# Patient Record
Sex: MALE | Race: WHITE | NOT HISPANIC OR LATINO | Employment: FULL TIME | URBAN - METROPOLITAN AREA
[De-identification: names, ages, dates, MRNs, and addresses within clinical notes are randomized per-mention and may not be internally consistent; named-entity substitution may affect disease eponyms.]

---

## 2023-07-18 ENCOUNTER — OFFICE VISIT (OUTPATIENT)
Dept: PRIMARY CARE | Facility: CLINIC | Age: 38
End: 2023-07-18
Payer: COMMERCIAL

## 2023-07-18 VITALS
HEART RATE: 82 BPM | WEIGHT: 217 LBS | BODY MASS INDEX: 26.42 KG/M2 | DIASTOLIC BLOOD PRESSURE: 82 MMHG | SYSTOLIC BLOOD PRESSURE: 121 MMHG | TEMPERATURE: 98.6 F | OXYGEN SATURATION: 96 % | HEIGHT: 76 IN

## 2023-07-18 DIAGNOSIS — R09.1 VIRAL PLEURISY: Primary | ICD-10-CM

## 2023-07-18 PROCEDURE — 1036F TOBACCO NON-USER: CPT | Performed by: FAMILY MEDICINE

## 2023-07-18 PROCEDURE — 99213 OFFICE O/P EST LOW 20 MIN: CPT | Performed by: FAMILY MEDICINE

## 2023-07-18 ASSESSMENT — LIFESTYLE VARIABLES
HOW OFTEN DO YOU HAVE A DRINK CONTAINING ALCOHOL: 2-3 TIMES A WEEK
HOW MANY STANDARD DRINKS CONTAINING ALCOHOL DO YOU HAVE ON A TYPICAL DAY: 1 OR 2
SKIP TO QUESTIONS 9-10: 1
HOW OFTEN DO YOU HAVE SIX OR MORE DRINKS ON ONE OCCASION: NEVER
AUDIT-C TOTAL SCORE: 3

## 2023-07-18 ASSESSMENT — ENCOUNTER SYMPTOMS
SHORTNESS OF BREATH: 1
ORTHOPNEA: 1
SWOLLEN GLANDS: 1
SORE THROAT: 1
HEADACHES: 1

## 2023-07-18 ASSESSMENT — PATIENT HEALTH QUESTIONNAIRE - PHQ9
1. LITTLE INTEREST OR PLEASURE IN DOING THINGS: NOT AT ALL
2. FEELING DOWN, DEPRESSED OR HOPELESS: NOT AT ALL
SUM OF ALL RESPONSES TO PHQ9 QUESTIONS 1 & 2: 0

## 2023-07-18 NOTE — PATIENT INSTRUCTIONS
Anticipate full recovery.  Rest fluids OTC NSAIDs as needed recommended  Schedule follow-up visit for complete physical

## 2023-07-18 NOTE — PROGRESS NOTES
"  Answers submitted by the patient for this visit:  Shortness of Breath Questionnaire (Submitted on 7/18/2023)  Chief Complaint: Shortness of breath  Chronicity: new  Onset: in the past 7 days  Frequency: constantly  Progression since onset: rapidly improving  chest pain: Yes  headaches: Yes  orthopnea: Yes  sore throat: Yes  swollen glands: Yes  Aggravating factors: nothing    38-year-old male 7 days ago developed low-grade fever body aches malaise fatigue with a Tmax of 99.6 x 48 hours ago developed pleuritic diffuse chest discomfort worse with inspiration without cough chest congestion wheeze hemoptysis leg pain swelling rash swollen tender joints.  The symptoms have practically resolved over the past 24 hours.  Denies pleuritic pain today.  Also denies nasal congestion sore throat otalgia otorrhea            /82   Pulse 82   Temp 37 °C (98.6 °F)   Ht 1.93 m (6' 4\")   Wt 98.4 kg (217 lb)   SpO2 96%   BMI 26.41 kg/m²       Well-appearing  Skin without pallor cyanosis icterus rash  TMs normal  Oropharynx normal neck supple nontender without adenopathy  Chest good inspiratory effort without rales rhonchi wheeze.  Chest wall nontender  Heart regular rate and rhythm without murmur  Abdomen soft nondistended nontender without organomegaly or mass  No swollen tender joints  Neuro grossly intact  " normal...

## 2023-08-18 ENCOUNTER — OFFICE VISIT (OUTPATIENT)
Dept: PRIMARY CARE | Facility: CLINIC | Age: 38
End: 2023-08-18
Payer: COMMERCIAL

## 2023-08-18 VITALS
OXYGEN SATURATION: 96 % | DIASTOLIC BLOOD PRESSURE: 70 MMHG | HEIGHT: 76 IN | WEIGHT: 214 LBS | BODY MASS INDEX: 26.06 KG/M2 | SYSTOLIC BLOOD PRESSURE: 124 MMHG | TEMPERATURE: 97.5 F | HEART RATE: 84 BPM

## 2023-08-18 DIAGNOSIS — Z00.00 ROUTINE ADULT HEALTH MAINTENANCE: Primary | ICD-10-CM

## 2023-08-18 DIAGNOSIS — R06.83 SNORING: ICD-10-CM

## 2023-08-18 DIAGNOSIS — J30.1 SEASONAL ALLERGIC RHINITIS DUE TO POLLEN: ICD-10-CM

## 2023-08-18 DIAGNOSIS — Z30.09 ENCOUNTER FOR VASECTOMY COUNSELING: ICD-10-CM

## 2023-08-18 PROCEDURE — 1036F TOBACCO NON-USER: CPT | Performed by: FAMILY MEDICINE

## 2023-08-18 PROCEDURE — 99395 PREV VISIT EST AGE 18-39: CPT | Performed by: FAMILY MEDICINE

## 2023-08-18 ASSESSMENT — PROMIS GLOBAL HEALTH SCALE
RATE_PHYSICAL_HEALTH: VERY GOOD
EMOTIONAL_PROBLEMS: SOMETIMES
CARRYOUT_PHYSICAL_ACTIVITIES: COMPLETELY
CARRYOUT_SOCIAL_ACTIVITIES: VERY GOOD
RATE_SOCIAL_SATISFACTION: VERY GOOD
RATE_GENERAL_HEALTH: VERY GOOD
RATE_QUALITY_OF_LIFE: VERY GOOD
RATE_AVERAGE_PAIN: 0
RATE_MENTAL_HEALTH: GOOD
RATE_AVERAGE_FATIGUE: MODERATE

## 2023-08-18 ASSESSMENT — PATIENT HEALTH QUESTIONNAIRE - PHQ9
SUM OF ALL RESPONSES TO PHQ9 QUESTIONS 1 & 2: 0
1. LITTLE INTEREST OR PLEASURE IN DOING THINGS: NOT AT ALL
2. FEELING DOWN, DEPRESSED OR HOPELESS: NOT AT ALL

## 2023-08-18 NOTE — PROGRESS NOTES
"C/o chronic nasal congestion and snorinh      ROS :   Denies fevers chills sweats malaise fatigue  Denies headache dysarthria aphasia focal weakness  Denies neck pain stiffness swollen glands sore throat otalgia otorrhea facial pressure  Denies chest pain shortness of breath diaphoresis nausea palpitations syncope presyncope dyspnea on exertion orthopnea cough  Denies abdominal pain nausea vomiting heartburn constipation diarrhea stool changes melena hematochezia  Denies urinary frequency dysuria hematuria pyuria hesitancy dribbling nocturia hematuria   Denies scrotal pain testicular mass erectile dysfunction  Denies numbness tingling weakness ataxia loss of dexterity  Denies depression anxiety insomnia      PE:    /70   Pulse 84   Temp 36.4 °C (97.5 °F)   Ht 1.93 m (6' 4\")   Wt 97.1 kg (214 lb)   SpO2 96%   BMI 26.05 kg/m²      Appears comfortable  Not ill-appearing  Normocephalic atraumatic  Respirations normal  No retractions  Skin  without pallor petechia icterus cyanosis  Neck without JVD thyromegaly bruits adenopathy  Chest clear to auscultation without rales rhonchi wheeze  Heart regular rate and rhythm without murmur  Abdomen soft nondistended nontender without organomegaly or mass   testes descended without mass or hernia  Rectal without mass or heme  Prostate normal without enlargement nodularity bogginess asymmetry or mass  Extremities without erythema edema Homans or cord  Peripheral pulses palpable  Neuro cranial nerves II through XII intact   no sensory  motor loss or tremor  Gait normal   Affect normal  Does not appear anxious or depressed          "

## 2023-08-21 ENCOUNTER — APPOINTMENT (OUTPATIENT)
Dept: PRIMARY CARE | Facility: CLINIC | Age: 38
End: 2023-08-21

## 2023-10-05 ENCOUNTER — ANCILLARY PROCEDURE (OUTPATIENT)
Dept: RADIOLOGY | Facility: CLINIC | Age: 38
End: 2023-10-05
Payer: COMMERCIAL

## 2023-10-05 DIAGNOSIS — J30.1 ALLERGIC RHINITIS DUE TO POLLEN: ICD-10-CM

## 2023-10-05 DIAGNOSIS — R06.83 SNORING: ICD-10-CM

## 2023-10-05 PROCEDURE — 70486 CT MAXILLOFACIAL W/O DYE: CPT

## 2023-10-05 PROCEDURE — 70486 CT MAXILLOFACIAL W/O DYE: CPT | Performed by: RADIOLOGY

## 2023-10-06 ENCOUNTER — TELEPHONE (OUTPATIENT)
Dept: PRIMARY CARE | Facility: CLINIC | Age: 38
End: 2023-10-06
Payer: COMMERCIAL

## 2023-10-06 DIAGNOSIS — J34.2 DEVIATED SEPTUM: Primary | ICD-10-CM

## 2023-10-06 NOTE — RESULT ENCOUNTER NOTE
CT results revealed markedly deviated septum.  This can cause chronic sinus problems.  I recommend follow-up with ENT this has been ordered

## 2023-10-12 ENCOUNTER — HOSPITAL ENCOUNTER (OUTPATIENT)
Facility: HOSPITAL | Age: 38
Setting detail: OUTPATIENT SURGERY
End: 2023-10-12
Attending: GENERAL PRACTICE | Admitting: GENERAL PRACTICE
Payer: COMMERCIAL

## 2023-10-12 ENCOUNTER — OFFICE VISIT (OUTPATIENT)
Dept: OTOLARYNGOLOGY | Facility: CLINIC | Age: 38
End: 2023-10-12
Payer: COMMERCIAL

## 2023-10-12 VITALS — BODY MASS INDEX: 25.93 KG/M2 | WEIGHT: 213 LBS

## 2023-10-12 DIAGNOSIS — J34.2 DEVIATED SEPTUM: ICD-10-CM

## 2023-10-12 DIAGNOSIS — M95.0 NASAL VALVE COLLAPSE: ICD-10-CM

## 2023-10-12 DIAGNOSIS — J34.3 HYPERTROPHY OF BOTH INFERIOR NASAL TURBINATES: ICD-10-CM

## 2023-10-12 DIAGNOSIS — M95.0 COLLAPSE OF NASAL VALVE: ICD-10-CM

## 2023-10-12 DIAGNOSIS — J31.0 CHRONIC RHINITIS: ICD-10-CM

## 2023-10-12 DIAGNOSIS — R09.81 NASAL CONGESTION: ICD-10-CM

## 2023-10-12 DIAGNOSIS — J01.91 ACUTE RECURRENT SINUSITIS, UNSPECIFIED LOCATION: Primary | ICD-10-CM

## 2023-10-12 DIAGNOSIS — J34.3 HYPERTROPHY, NASAL, TURBINATE: ICD-10-CM

## 2023-10-12 PROBLEM — J34.829 NASAL VALVE COLLAPSE: Status: ACTIVE | Noted: 2023-10-12

## 2023-10-12 PROCEDURE — 99204 OFFICE O/P NEW MOD 45 MIN: CPT | Performed by: GENERAL PRACTICE

## 2023-10-12 PROCEDURE — 1036F TOBACCO NON-USER: CPT | Performed by: GENERAL PRACTICE

## 2023-10-12 ASSESSMENT — PATIENT HEALTH QUESTIONNAIRE - PHQ9
2. FEELING DOWN, DEPRESSED OR HOPELESS: NOT AT ALL
1. LITTLE INTEREST OR PLEASURE IN DOING THINGS: NOT AT ALL
SUM OF ALL RESPONSES TO PHQ9 QUESTIONS 1 AND 2: 0

## 2023-10-12 NOTE — PROGRESS NOTES
Otolaryngology - Head and Neck Surgery Outpatient New Patient Visit Note         History Of Present Illness  Turner Knott is a 38 y.o. male presenting for evaluation of chronic nasal obstruction/congestion as well as snoring and possible EBER.    Reports longstanding nasal obstruction/congestion.    Reports waxing/waning symptoms of allergic rhinitis over time.  Limited imporvement with use of flonase and prior heavy use of antihistamines.     Reports flares of rhinosinusitis multiple times per year.     No prior nasal trauma/surgery.    Reports use of nasal cones with good effect, but often fall out overnight.     No PSG on file, prefers to avoid CPAP.               Past Medical History  He has no past medical history on file.    Surgical History  He has a past surgical history that includes No past surgeries.     Social History  He reports that he has never smoked. He has never used smokeless tobacco. No history on file for alcohol use and drug use.    Family History  Family History   Problem Relation Name Age of Onset    No Known Problems Mother      Diabetes Father          Allergies  Shellfish derived    Review of Systems  ROS: Pertinent positives as noted in HPI.    - CONSTITUTIONAL: Does not report weight loss, fever or chills.    - HEENT:   Ear: Does not report tinnitus, vertigo, hearing loss, otalgia, otorrhea  Nose: Does not report  ,  , epistaxis, decreased smell  Throat: Does not report pain, dysphagia, odynophagia  Larynx: Does not report hoarseness,  difficulty breathing, pain with speaking (odynophonia)  Neck: Does not report new masses, pain, swelling  Face: Does not report sinus pain, pressure, swelling, numbness, weakness     - RESPIRATORY: Does not report SOB or cough.    - CV: Does not report palpitations or chest pain.     - GI: Does not report abdominal pain, nausea, vomiting or diarrhea.    - : Does not report dysuria or urinary frequency.    - MSK: Does not report myalgia or joint  pain.    - SKIN: Does not report rash or pruritus.    - NEUROLOGICAL: Does not report headache or syncope.    - PSYCHIATRIC: Does not report recent changes in mood. Does not report anxiety or depression.         Physical Exam:     GENERAL:   Alert & Oriented to person, place and time; Normal affect and appearance. Well developed and well nourished. Conversant & cooperative with examination.     HEAD:   Normocephalic, atraumatic. No sinus tenderness to palpation. Normal parotid bilaterally. Normal facial strength.     NEUROLOGIC:   Cranial nerves II-XII grossly intact, gait WNL. Normal mood and affect.    EYES:   Extraocular movements intact. Pupils equal, round, reactive to light and accommodation. No nystagmus, no ptosis. no scleral injection.    EAR:   Normal auricle. No discomfort or TTP with manipulation.   Handheld otoscopic exam showed normal external auditory canals bilaterally. No purulence or EAC inflammation. Minimal cerumen.   Right tympanic membrane clear and mobile without evidence of perforation, retraction or middle ear effusion.   Left tympanic membrane clear and mobile without evidence of perforation, retraction or middle ear effusion.     NOSE:   No external deformity. No external nasal lesions, lacerations, or scars. Nasal tip symmetrical with normal nasal valves.  Dynamic collapse R>L with improvement with modified rosalee.    Nasal cavity with mid/posterior leftward septum, edematous  mucosa and turbinates. No lesions, masses, purulence or polyps.     OC/OP:   Mucous membranes moist, no masses, lesions or exudates.   Normal tongue, floor of mouth, teeth, gums, lips. Normal posterior pharyngeal wall.    Normal tonsils without erythema, exudate or obvious calculi     NECK:   No neck masses or thyroid enlargement. Trachea midline. No tenderness to palpation    LYMPHATIC:   No cervical lymphadenopathy.     RESPIRATORY:   Symmetric chest elevation & no retractions. No significant hoarseness. No  increased work of breathing.    CV:   No clubbing or cyanosis. No obvious edema    Skin:   No facial rashes, vesicles or lesions.     Extremities:   No gross abnormalities      Clinic Procedure        Information review:  External sources (notes, imaging, lab results) listed below personally reviewed to aid in medical decision making process.  - CT sinus  10/5/23 reviewed showing leftward deviated septum, paradoxical MT, R>L inferior turbinate hypertrophy, no significant sinusitis.     -[facility] Aaron Wen 8/18/23 note reviewed  - [facility] Oscar Gayle 7/18/23 note reviewed      Assessment/Plan   Problem List Items Addressed This Visit             ICD-10-CM       ENT    Deviated septum J34.2    Relevant Orders    Case Request Operating Room: Repair Septum Nasal Cavity, Repair Septum Nasal Cavity with Reduction Turbinate, Excision Lesion Nasal Cavity, Repair Nasal Valve (Completed)    Hypertrophy, nasal, turbinate J34.3    Relevant Orders    Case Request Operating Room: Repair Septum Nasal Cavity, Repair Septum Nasal Cavity with Reduction Turbinate, Excision Lesion Nasal Cavity, Repair Nasal Valve (Completed)    Nasal valve collapse M95.0    Relevant Orders    Case Request Operating Room: Repair Septum Nasal Cavity, Repair Septum Nasal Cavity with Reduction Turbinate, Excision Lesion Nasal Cavity, Repair Nasal Valve (Completed)     Other Visit Diagnoses         Codes    Acute recurrent sinusitis, unspecified location    -  Primary J01.91    Chronic rhinitis     J31.0          38yoM with snoring and likely EBER in the setting of nasal obstructino/congetion.    Discussed possible PSG and consideration of CPAP but pt declines for now.  Discussed possible ongoing medical management for rhinitis.   Discussed consideration of septoturbinoplasty, possible latera implant, possible clarifix procedure and the pt would like to proceed with this plan.     Risks, benefits and indications of the procedure were discussed.  This included  discussion of risks of pain, bleeding, infection, scarring with poor functional or cosmetic result, need for future procedures, or damage to surrounding structures to include regional blood vessels and nerves.  In particular, the risk of CSF leak, ongoing obstruction was highlighted.  All questions were answered.  The patient/caregiver indicated understanding of the discussion and elected to proceed with the procedure.  Will schedule as available.      Follow up:  -plan for follow up in clinic as needed    All of the above findings, impressions, treatment planning and follow up plans were discussed with the patient who indicated understanding.  the patient was instructed to contact or return to clinic sooner if symptoms/signs persist or worsen despite the above management.      Nico Mckinney MD  Otolaryngology - Head and Neck Surgery

## 2023-10-13 ENCOUNTER — LAB (OUTPATIENT)
Dept: LAB | Facility: LAB | Age: 38
End: 2023-10-13
Payer: COMMERCIAL

## 2023-10-13 DIAGNOSIS — Z00.00 ROUTINE ADULT HEALTH MAINTENANCE: ICD-10-CM

## 2023-10-13 LAB
ALBUMIN SERPL BCP-MCNC: 4.5 G/DL (ref 3.4–5)
ALP SERPL-CCNC: 85 U/L (ref 33–120)
ALT SERPL W P-5'-P-CCNC: 31 U/L (ref 10–52)
ANION GAP SERPL CALC-SCNC: 13 MMOL/L (ref 10–20)
AST SERPL W P-5'-P-CCNC: 24 U/L (ref 9–39)
BILIRUB SERPL-MCNC: 1.3 MG/DL (ref 0–1.2)
BUN SERPL-MCNC: 24 MG/DL (ref 6–23)
CALCIUM SERPL-MCNC: 9.8 MG/DL (ref 8.6–10.6)
CHLORIDE SERPL-SCNC: 101 MMOL/L (ref 98–107)
CHOLEST SERPL-MCNC: 252 MG/DL (ref 0–199)
CHOLESTEROL/HDL RATIO: 6.5
CO2 SERPL-SCNC: 28 MMOL/L (ref 21–32)
CREAT SERPL-MCNC: 1.2 MG/DL (ref 0.5–1.3)
GFR SERPL CREATININE-BSD FRML MDRD: 79 ML/MIN/1.73M*2
GLUCOSE SERPL-MCNC: 95 MG/DL (ref 74–99)
HDLC SERPL-MCNC: 38.8 MG/DL
LDLC SERPL CALC-MCNC: 169 MG/DL
NON HDL CHOLESTEROL: 213 MG/DL (ref 0–149)
POTASSIUM SERPL-SCNC: 4.2 MMOL/L (ref 3.5–5.3)
PROT SERPL-MCNC: 7.3 G/DL (ref 6.4–8.2)
SODIUM SERPL-SCNC: 138 MMOL/L (ref 136–145)
TRIGL SERPL-MCNC: 220 MG/DL (ref 0–149)
VLDL: 44 MG/DL (ref 0–40)

## 2023-10-13 PROCEDURE — 80061 LIPID PANEL: CPT

## 2023-10-13 PROCEDURE — 36415 COLL VENOUS BLD VENIPUNCTURE: CPT

## 2023-10-13 PROCEDURE — 80053 COMPREHEN METABOLIC PANEL: CPT

## 2023-10-18 PROBLEM — D50.8 HYPOCHROMIC RED BLOOD CELLS: Status: ACTIVE | Noted: 2018-05-03

## 2023-10-18 PROBLEM — F41.9 ANXIETY: Status: ACTIVE | Noted: 2018-04-23

## 2023-10-18 PROBLEM — F32.A DEPRESSION: Status: ACTIVE | Noted: 2018-04-23

## 2023-10-18 RX ORDER — TOBRAMYCIN 3 MG/ML
SOLUTION/ DROPS OPHTHALMIC
COMMUNITY
Start: 2021-12-19

## 2023-10-18 RX ORDER — TOBRAMYCIN AND DEXAMETHASONE 3; 1 MG/ML; MG/ML
SUSPENSION/ DROPS OPHTHALMIC
COMMUNITY
Start: 2022-11-18

## 2023-10-18 RX ORDER — DICLOFENAC SODIUM 10 MG/G
GEL TOPICAL
COMMUNITY
Start: 2013-12-09

## 2023-10-18 RX ORDER — CETIRIZINE HYDROCHLORIDE, PSEUDOEPHEDRINE HYDROCHLORIDE 5; 120 MG/1; MG/1
1 TABLET, FILM COATED, EXTENDED RELEASE ORAL
COMMUNITY
Start: 2021-12-19

## 2023-10-18 RX ORDER — HYDROXYZINE HYDROCHLORIDE 25 MG/1
25-50 TABLET, FILM COATED ORAL 3 TIMES DAILY PRN
COMMUNITY
Start: 2018-04-23

## 2023-10-18 RX ORDER — MELOXICAM 15 MG/1
15 TABLET ORAL DAILY
COMMUNITY
Start: 2013-11-26

## 2023-10-19 ENCOUNTER — OFFICE VISIT (OUTPATIENT)
Dept: UROLOGY | Facility: HOSPITAL | Age: 38
End: 2023-10-19
Payer: COMMERCIAL

## 2023-10-19 DIAGNOSIS — Z30.09 ENCOUNTER FOR VASECTOMY COUNSELING: Primary | ICD-10-CM

## 2023-10-19 PROCEDURE — 99213 OFFICE O/P EST LOW 20 MIN: CPT | Performed by: UROLOGY

## 2023-10-19 PROCEDURE — 99203 OFFICE O/P NEW LOW 30 MIN: CPT | Performed by: UROLOGY

## 2023-10-19 PROCEDURE — 1036F TOBACCO NON-USER: CPT | Performed by: UROLOGY

## 2023-10-19 NOTE — PROGRESS NOTES
HPI  38 y.o. year old male being seen for vasectomy consult.      , 2 children, ages 3, 4mo. Currently using condoms for protection. No bothersome LUTS. No UTIs, hematuria, stones. No history of scrotal trauma or surgery. Erections work well.     Current Medications:  Current Outpatient Medications   Medication Sig Dispense Refill    cetirizine-pseudoephedrine (ZyrTEC-D) 5-120 mg 12 hr tablet Take 1 tablet by mouth once daily.      diclofenac sodium (Voltaren) 1 % gel gel apply to affected area four times daily as directed      hydrOXYzine HCL (Atarax) 25 mg tablet Take 1-2 tablets (25-50 mg) by mouth 3 times a day as needed for anxiety (or sleep).      meloxicam (Mobic) 15 mg tablet Take 1 tablet (15 mg) by mouth once daily.      tobramycin (Tobrex) 0.3 % ophthalmic solution Use 2 drops in the effected eye every 4 hours while awake.      tobramycin-dexamethasone (Tobradex) ophthalmic suspension INSTILL 1 DROP IN THE RIGHT EYE EVERY 4 HOURS DAILY.       No current facility-administered medications for this visit.        Active Problems:  Turner Knott is a 38 y.o. male with the following Problems and Medications.  Patient Active Problem List   Diagnosis    Deviated septum    Hypertrophy, nasal, turbinate    Nasal valve collapse    Anxiety    Depression    Hypochromic red blood cells     Current Outpatient Medications   Medication Sig Dispense Refill    cetirizine-pseudoephedrine (ZyrTEC-D) 5-120 mg 12 hr tablet Take 1 tablet by mouth once daily.      diclofenac sodium (Voltaren) 1 % gel gel apply to affected area four times daily as directed      hydrOXYzine HCL (Atarax) 25 mg tablet Take 1-2 tablets (25-50 mg) by mouth 3 times a day as needed for anxiety (or sleep).      meloxicam (Mobic) 15 mg tablet Take 1 tablet (15 mg) by mouth once daily.      tobramycin (Tobrex) 0.3 % ophthalmic solution Use 2 drops in the effected eye every 4 hours while awake.      tobramycin-dexamethasone (Tobradex) ophthalmic  suspension INSTILL 1 DROP IN THE RIGHT EYE EVERY 4 HOURS DAILY.       No current facility-administered medications for this visit.       PMH:  No past medical history on file.    PSH:  Past Surgical History:   Procedure Laterality Date    NO PAST SURGERIES         FMH:  Family History   Problem Relation Name Age of Onset    No Known Problems Mother      Diabetes Father         SHx:  Social History     Tobacco Use    Smoking status: Never    Smokeless tobacco: Never       Allergies:  Allergies   Allergen Reactions    Shellfish Derived Hives, Itching and Shortness of breath     Assesment/Plan  We discussed that vasectomy is intended to be a permanent form of contraception, and that while options for reversal exist they are often costly, time-intensive, and not guaranteed to produce ejaculate with motile sperm.      We discussed that vasectomy does not produce immediate sterility, and following vasectomy another form of contraception is required until vas occlusion is confirmed by post-vasectomy semen analysis. We discussed the post-procedural pathway, including the recommendation to abstain from ejaculation for 1 week after vasectomy as well as post-operative activity restrictions and care. We discussed that 8-16 weeks after vasectomy is the appropriate time for the first PVSA.      We discussed that even after vas occlusion is confirmed, vasectomy is not 100% reliable in preventing regrowth due to the risk of recannulation. The risk of pregnancy after vasectomy is approximately 1/2,000 in men who have documented post-vasectomy azoospermia or rare non-motile sperm.      We discussed the less than 1% risk of repeat vasectomy to achieve sterility. We discussed the rate of procedural complications including symptomatic hematoma and infection of 1-2%. We discussed the risk of chronic scrotal pain in about 1-2% of men. We discussed other options for permanent and non-permanent contraception including barrier methods, IUD,  oral contraception, and tubal ligation.      The patient understands all r/b/a and would like to proceed.    Scribe Attestation  By signing my name below, I, Akiko Nieto   attest that this documentation has been prepared under the direction and in the presence of Nayan Willingham MD.

## 2023-11-10 ENCOUNTER — PROCEDURE VISIT (OUTPATIENT)
Dept: UROLOGY | Facility: HOSPITAL | Age: 38
End: 2023-11-10
Payer: COMMERCIAL

## 2023-11-10 VITALS
WEIGHT: 210 LBS | HEART RATE: 82 BPM | BODY MASS INDEX: 28.44 KG/M2 | SYSTOLIC BLOOD PRESSURE: 155 MMHG | DIASTOLIC BLOOD PRESSURE: 88 MMHG | HEIGHT: 72 IN

## 2023-11-10 DIAGNOSIS — Z30.2 ADMISSION FOR VASECTOMY: Primary | ICD-10-CM

## 2023-11-10 PROCEDURE — 55250 REMOVAL OF SPERM DUCT(S): CPT | Performed by: UROLOGY

## 2023-11-10 NOTE — PROGRESS NOTES
HPI    38 y.o. old male here today for vasectomy. Risks, benefits, and alternatives discussed and he elected to proceed.                     Current Medications:  Current Outpatient Medications   Medication Sig Dispense Refill    cetirizine-pseudoephedrine (ZyrTEC-D) 5-120 mg 12 hr tablet Take 1 tablet by mouth once daily.      diclofenac sodium (Voltaren) 1 % gel gel apply to affected area four times daily as directed      hydrOXYzine HCL (Atarax) 25 mg tablet Take 1-2 tablets (25-50 mg) by mouth 3 times a day as needed for anxiety (or sleep).      meloxicam (Mobic) 15 mg tablet Take 1 tablet (15 mg) by mouth once daily.      tobramycin (Tobrex) 0.3 % ophthalmic solution Use 2 drops in the effected eye every 4 hours while awake.      tobramycin-dexamethasone (Tobradex) ophthalmic suspension INSTILL 1 DROP IN THE RIGHT EYE EVERY 4 HOURS DAILY.       No current facility-administered medications for this visit.        Active Problems:  Truner Knott is a 38 y.o. male with the following Problems and Medications.  Patient Active Problem List   Diagnosis    Deviated septum    Hypertrophy, nasal, turbinate    Nasal valve collapse    Anxiety    Depression    Hypochromic red blood cells    Encounter for vasectomy counseling     Current Outpatient Medications   Medication Sig Dispense Refill    cetirizine-pseudoephedrine (ZyrTEC-D) 5-120 mg 12 hr tablet Take 1 tablet by mouth once daily.      diclofenac sodium (Voltaren) 1 % gel gel apply to affected area four times daily as directed      hydrOXYzine HCL (Atarax) 25 mg tablet Take 1-2 tablets (25-50 mg) by mouth 3 times a day as needed for anxiety (or sleep).      meloxicam (Mobic) 15 mg tablet Take 1 tablet (15 mg) by mouth once daily.      tobramycin (Tobrex) 0.3 % ophthalmic solution Use 2 drops in the effected eye every 4 hours while awake.      tobramycin-dexamethasone (Tobradex) ophthalmic suspension INSTILL 1 DROP IN THE RIGHT EYE EVERY 4 HOURS DAILY.       No  current facility-administered medications for this visit.       PMH:  No past medical history on file.    PSH:  Past Surgical History:   Procedure Laterality Date    NO PAST SURGERIES         FMH:  Family History   Problem Relation Name Age of Onset    No Known Problems Mother      Diabetes Father         SHx:  Social History     Tobacco Use    Smoking status: Never    Smokeless tobacco: Never       Allergies:  Allergies   Allergen Reactions    Shellfish Derived Hives, Itching and Shortness of breath     Procedure:  Procedure:  Preprocedure diagnosis and postprocedure diagnosis:   Seeking elective sterilization     Procedure:    Vasectomy     Findings:    Successful excision of vas deferens segment with occlusion and ligation of the proximal and distal vasal ends bilaterally     Specimens:   1. Right vas   2. Left vas     Description of procedure:     After informed sent was obtained, the patient was brought back to the procedure room.  He was positioned supine exertion.  He was prepped and draped in the standard fashion.  The penis was secured cephalad.  We began by injecting local anesthetic in the form of 1% lidocaine in the midline of the scrotum, raising a wheal.  Once local anesthetic had set up, identified the right vas by palpation and deliver this towards the midline.  I used the no scalpel vasectomy  to create opening in the skin and use the vas grasper to deliver the right vas up through this opening.  I then carefully dissected off the overlying connective tissues, exposing the vas itself.  I then isolated a approximately 1.5 cm segment and divided the vas.  The proximal and distal ends of the vas were ligated with 3-0 silk sutures with the lumens extensively cauterized.  Once adequate hemostasis was ensured, the vas was returned to the scrotum in orthotopic position.  The identical procedure was performed on the left side.     The patient was then cleaned with saline and bacitracin ointment was  applied to the skin opening. We again discussed postoperative instructions and return precautions. He will follow up in 8-16 weeks with his post-vasectomy semenanalysis.         Assesment/Plan    Successful bilateral vasectomy. Postop instructions, precautions, expectations reviewed. Will follow up with PVSA in 8-16 weeks, sooner prn.       Scribe Attestation  By signing my name below, IDori , Scribe   attest that this documentation has been prepared under the direction and in the presence of Nayan Willingham MD. =e

## 2023-11-20 ENCOUNTER — ANESTHESIA EVENT (OUTPATIENT)
Dept: OPERATING ROOM | Facility: HOSPITAL | Age: 38
End: 2023-11-20

## 2023-11-20 RX ORDER — LABETALOL HYDROCHLORIDE 5 MG/ML
5 INJECTION, SOLUTION INTRAVENOUS ONCE AS NEEDED
Status: CANCELLED | OUTPATIENT
Start: 2023-11-20

## 2023-11-20 RX ORDER — MORPHINE SULFATE 2 MG/ML
2 INJECTION, SOLUTION INTRAMUSCULAR; INTRAVENOUS EVERY 5 MIN PRN
Status: CANCELLED | OUTPATIENT
Start: 2023-11-20

## 2023-11-20 RX ORDER — DROPERIDOL 2.5 MG/ML
0.62 INJECTION, SOLUTION INTRAMUSCULAR; INTRAVENOUS ONCE AS NEEDED
Status: CANCELLED | OUTPATIENT
Start: 2023-11-20

## 2023-11-20 RX ORDER — HYDRALAZINE HYDROCHLORIDE 20 MG/ML
5 INJECTION INTRAMUSCULAR; INTRAVENOUS EVERY 30 MIN PRN
Status: CANCELLED | OUTPATIENT
Start: 2023-11-20

## 2023-11-20 RX ORDER — FAMOTIDINE 10 MG/ML
20 INJECTION INTRAVENOUS ONCE
Status: CANCELLED | OUTPATIENT
Start: 2023-11-20 | End: 2023-11-20

## 2023-11-20 RX ORDER — LIDOCAINE HYDROCHLORIDE 10 MG/ML
0.1 INJECTION, SOLUTION EPIDURAL; INFILTRATION; INTRACAUDAL; PERINEURAL ONCE
Status: CANCELLED | OUTPATIENT
Start: 2023-11-20 | End: 2023-11-20

## 2023-11-20 RX ORDER — DIPHENHYDRAMINE HYDROCHLORIDE 50 MG/ML
12.5 INJECTION INTRAMUSCULAR; INTRAVENOUS ONCE AS NEEDED
Status: CANCELLED | OUTPATIENT
Start: 2023-11-20

## 2023-11-20 RX ORDER — SODIUM CHLORIDE, SODIUM LACTATE, POTASSIUM CHLORIDE, CALCIUM CHLORIDE 600; 310; 30; 20 MG/100ML; MG/100ML; MG/100ML; MG/100ML
100 INJECTION, SOLUTION INTRAVENOUS CONTINUOUS
Status: CANCELLED | OUTPATIENT
Start: 2023-11-20

## 2023-11-20 RX ORDER — ONDANSETRON HYDROCHLORIDE 2 MG/ML
4 INJECTION, SOLUTION INTRAVENOUS ONCE AS NEEDED
Status: CANCELLED | OUTPATIENT
Start: 2023-11-20

## 2023-11-20 RX ORDER — OXYCODONE AND ACETAMINOPHEN 5; 325 MG/1; MG/1
1 TABLET ORAL EVERY 4 HOURS PRN
Status: CANCELLED | OUTPATIENT
Start: 2023-11-20

## 2023-11-20 RX ORDER — ALBUTEROL SULFATE 0.83 MG/ML
2.5 SOLUTION RESPIRATORY (INHALATION) ONCE AS NEEDED
Status: CANCELLED | OUTPATIENT
Start: 2023-11-20

## 2023-12-01 ENCOUNTER — ANESTHESIA (OUTPATIENT)
Dept: OPERATING ROOM | Facility: HOSPITAL | Age: 38
End: 2023-12-01
Payer: COMMERCIAL

## 2023-12-06 ENCOUNTER — APPOINTMENT (OUTPATIENT)
Dept: OTOLARYNGOLOGY | Facility: CLINIC | Age: 38
End: 2023-12-06
Payer: COMMERCIAL

## 2023-12-07 PROBLEM — R09.81 NASAL CONGESTION: Status: ACTIVE | Noted: 2023-10-12

## 2023-12-07 PROBLEM — M95.0 COLLAPSE OF NASAL VALVE: Status: ACTIVE | Noted: 2023-10-12

## 2023-12-07 PROBLEM — J34.3 HYPERTROPHY OF BOTH INFERIOR NASAL TURBINATES: Status: ACTIVE | Noted: 2023-10-12

## 2023-12-07 PROBLEM — J34.829 COLLAPSE OF NASAL VALVE: Status: ACTIVE | Noted: 2023-10-12

## 2023-12-19 DIAGNOSIS — Z98.52 STATUS POST VASECTOMY: ICD-10-CM

## 2024-01-03 ENCOUNTER — APPOINTMENT (OUTPATIENT)
Dept: ENDOCRINOLOGY | Facility: CLINIC | Age: 39
End: 2024-01-03
Payer: COMMERCIAL

## 2024-01-03 DIAGNOSIS — Z98.52 STATUS POST VASECTOMY: ICD-10-CM

## 2024-01-04 ENCOUNTER — ANCILLARY PROCEDURE (OUTPATIENT)
Dept: ENDOCRINOLOGY | Facility: CLINIC | Age: 39
End: 2024-01-04
Payer: COMMERCIAL

## 2024-01-04 DIAGNOSIS — Z98.52 STATUS POST VASECTOMY: ICD-10-CM

## 2024-01-04 LAB
ABSTINENCE (DAYS): 2 DAYS (ref 2–7)
AGGLUTINATION (SEMEN): NO
ANALYZED TIME:: ABNORMAL
ANDROLOGY LAB ID#: ABNORMAL
CLUMPS (SEMEN): NO
COLLECTED COMPLETELY: YES
COLLECTION LOCATION:: ABNORMAL
COLLECTION METHOD:: ABNORMAL
CONCENTRATION CN (POST-WASH): 0 MILL/ML
CONCENTRATION(SEMEN): 0 MILL/ML (ref 15–?)
DEBRIS (SEMEN): YES
LEUKOCYTE (SEMEN): ABNORMAL
NON PROG. MOTILITY (SEMEN): 0 %
NON PROG. MOTILITY CN (POST-WASH): 0 %
PROG. MOTILITY (SEMEN): 0 % (ref 32–?)
PROG. MOTILITY CN (POST-WASH): 0 %
RECEIVED TIME:: ABNORMAL
SEMEN APPEARANCE: NORMAL
SEMEN LIQUEFACTION: NORMAL
SEMEN VISCOSITY: NORMAL
TOTAL MOTILITY (SEMEN): 0 % (ref 40–?)
TOTAL MOTILITY CN (POST-WASH): 0 %
TOTAL NO OF MOTILE (SEMEN): 0 MILL
TOTAL NO OF MOTILE CN (POST-WASH): 0 MILL
TOTAL NO OF SPERM (SEMEN): 0 MILL (ref 39–?)
TOTAL NO OF SPERM CN (POST-WASH): 0 MILL
VOLUME (SEMEN): 7 ML (ref 1.5–?)
VOLUME CN (POST-WASH): 0.2 ML

## 2024-01-04 PROCEDURE — 89321 SEMEN ANAL SPERM DETECTION: CPT | Performed by: OBSTETRICS & GYNECOLOGY

## 2024-03-28 ENCOUNTER — TELEMEDICINE CLINICAL SUPPORT (OUTPATIENT)
Dept: PREADMISSION TESTING | Facility: HOSPITAL | Age: 39
End: 2024-03-28
Payer: COMMERCIAL

## 2024-03-28 NOTE — PREPROCEDURE INSTRUCTIONS
No outpatient medications have been marked as taking for the 3/28/24 encounter (Telemedicine Clinical Support) with [facility].                 NPO Instructions:     Do not drink any liquid after midnight the night before your surgery  Do not eat any food after midnight the night before your surgery/procedure.  Candy, gum, mints and smoking of cigarettes, marijuana or vaping is not permitted after midnight prior to your surgery   Do not drink Alcohol 24 hours prior to surgery      Increase fluid intake day before surgery    Additional Instructions:      Review your medication instructions, take indicated medications    Wear  comfortable loose fitting clothing  Do not use moisturizers, creams, lotions or perfume  All jewelry and valuables should be left at home. May bring glasses and partials.    Stop blood thinning medications as instructed by ordering physician or surgeon    Shower or bathe the night before or day of surgery.   Brush teeth and avoid perfumes, colognes, powders, makeup, aftershave and hair spray    Go to Registration, in the main lobby, upon arrival on the day of surgery and have 's license and medical insurance card available.    Call [phone] the day before your surgery/procedure to find out what time you are to arrive the next day.     Please have a responsible adult to drive you home and be available to help you as needed after surgery.      NPO Instructions:    Do not eat any food after midnight the night before your surgery/procedure.

## 2024-04-01 ENCOUNTER — ANESTHESIA EVENT (OUTPATIENT)
Dept: OPERATING ROOM | Facility: HOSPITAL | Age: 39
End: 2024-04-01
Payer: COMMERCIAL

## 2024-04-04 PROBLEM — J31.0 CHRONIC RHINITIS: Status: ACTIVE | Noted: 2024-04-04

## 2024-04-05 ENCOUNTER — HOSPITAL ENCOUNTER (OUTPATIENT)
Facility: HOSPITAL | Age: 39
Setting detail: OUTPATIENT SURGERY
Discharge: HOME | End: 2024-04-05
Attending: GENERAL PRACTICE | Admitting: GENERAL PRACTICE
Payer: COMMERCIAL

## 2024-04-05 ENCOUNTER — ANESTHESIA (OUTPATIENT)
Dept: OPERATING ROOM | Facility: HOSPITAL | Age: 39
End: 2024-04-05
Payer: COMMERCIAL

## 2024-04-05 ENCOUNTER — PHARMACY VISIT (OUTPATIENT)
Dept: PHARMACY | Facility: CLINIC | Age: 39
End: 2024-04-05
Payer: COMMERCIAL

## 2024-04-05 VITALS
DIASTOLIC BLOOD PRESSURE: 80 MMHG | OXYGEN SATURATION: 97 % | HEART RATE: 62 BPM | SYSTOLIC BLOOD PRESSURE: 132 MMHG | WEIGHT: 210 LBS | HEIGHT: 72 IN | RESPIRATION RATE: 12 BRPM | BODY MASS INDEX: 28.44 KG/M2 | TEMPERATURE: 97.7 F

## 2024-04-05 DIAGNOSIS — R09.81 NASAL CONGESTION: ICD-10-CM

## 2024-04-05 DIAGNOSIS — Z98.890 S/P NASAL SEPTOPLASTY: Primary | ICD-10-CM

## 2024-04-05 DIAGNOSIS — J34.3 HYPERTROPHY OF BOTH INFERIOR NASAL TURBINATES: ICD-10-CM

## 2024-04-05 DIAGNOSIS — J34.2 DEVIATED SEPTUM: ICD-10-CM

## 2024-04-05 DIAGNOSIS — M95.0 COLLAPSE OF NASAL VALVE: ICD-10-CM

## 2024-04-05 LAB
ERYTHROCYTE [DISTWIDTH] IN BLOOD BY AUTOMATED COUNT: 11.9 % (ref 11.5–14.5)
HCT VFR BLD AUTO: 46.3 % (ref 41–52)
HGB BLD-MCNC: 17.1 G/DL (ref 13.5–17.5)
MCH RBC QN AUTO: 32.9 PG (ref 26–34)
MCHC RBC AUTO-ENTMCNC: 36.9 G/DL (ref 32–36)
MCV RBC AUTO: 89 FL (ref 80–100)
NRBC BLD-RTO: 0 /100 WBCS (ref 0–0)
PLATELET # BLD AUTO: 195 X10*3/UL (ref 150–450)
RBC # BLD AUTO: 5.19 X10*6/UL (ref 4.5–5.9)
WBC # BLD AUTO: 6.5 X10*3/UL (ref 4.4–11.3)

## 2024-04-05 PROCEDURE — 3600000009 HC OR TIME - EACH INCREMENTAL 1 MINUTE - PROCEDURE LEVEL FOUR: Performed by: GENERAL PRACTICE

## 2024-04-05 PROCEDURE — 2500000004 HC RX 250 GENERAL PHARMACY W/ HCPCS (ALT 636 FOR OP/ED): Performed by: ANESTHESIOLOGY

## 2024-04-05 PROCEDURE — 7100000010 HC PHASE TWO TIME - EACH INCREMENTAL 1 MINUTE: Performed by: GENERAL PRACTICE

## 2024-04-05 PROCEDURE — 30520 REPAIR OF NASAL SEPTUM: CPT | Performed by: GENERAL PRACTICE

## 2024-04-05 PROCEDURE — 30117 REMOVAL OF INTRANASAL LESION: CPT | Performed by: GENERAL PRACTICE

## 2024-04-05 PROCEDURE — 2500000004 HC RX 250 GENERAL PHARMACY W/ HCPCS (ALT 636 FOR OP/ED): Performed by: GENERAL PRACTICE

## 2024-04-05 PROCEDURE — 30140 RESECT INFERIOR TURBINATE: CPT | Performed by: GENERAL PRACTICE

## 2024-04-05 PROCEDURE — 2500000005 HC RX 250 GENERAL PHARMACY W/O HCPCS: Performed by: GENERAL PRACTICE

## 2024-04-05 PROCEDURE — 3700000002 HC GENERAL ANESTHESIA TIME - EACH INCREMENTAL 1 MINUTE: Performed by: GENERAL PRACTICE

## 2024-04-05 PROCEDURE — 2500000004 HC RX 250 GENERAL PHARMACY W/ HCPCS (ALT 636 FOR OP/ED): Mod: JZ | Performed by: GENERAL PRACTICE

## 2024-04-05 PROCEDURE — 85027 COMPLETE CBC AUTOMATED: CPT | Performed by: ANESTHESIOLOGY

## 2024-04-05 PROCEDURE — A4217 STERILE WATER/SALINE, 500 ML: HCPCS | Performed by: GENERAL PRACTICE

## 2024-04-05 PROCEDURE — 7100000002 HC RECOVERY ROOM TIME - EACH INCREMENTAL 1 MINUTE: Performed by: GENERAL PRACTICE

## 2024-04-05 PROCEDURE — 3600000004 HC OR TIME - INITIAL BASE CHARGE - PROCEDURE LEVEL FOUR: Performed by: GENERAL PRACTICE

## 2024-04-05 PROCEDURE — 2500000001 HC RX 250 WO HCPCS SELF ADMINISTERED DRUGS (ALT 637 FOR MEDICARE OP): Performed by: GENERAL PRACTICE

## 2024-04-05 PROCEDURE — 7100000001 HC RECOVERY ROOM TIME - INITIAL BASE CHARGE: Performed by: GENERAL PRACTICE

## 2024-04-05 PROCEDURE — 2500000005 HC RX 250 GENERAL PHARMACY W/O HCPCS: Performed by: NURSE ANESTHETIST, CERTIFIED REGISTERED

## 2024-04-05 PROCEDURE — RXMED WILLOW AMBULATORY MEDICATION CHARGE

## 2024-04-05 PROCEDURE — 2500000004 HC RX 250 GENERAL PHARMACY W/ HCPCS (ALT 636 FOR OP/ED): Performed by: NURSE ANESTHETIST, CERTIFIED REGISTERED

## 2024-04-05 PROCEDURE — 3700000001 HC GENERAL ANESTHESIA TIME - INITIAL BASE CHARGE: Performed by: GENERAL PRACTICE

## 2024-04-05 PROCEDURE — 7100000009 HC PHASE TWO TIME - INITIAL BASE CHARGE: Performed by: GENERAL PRACTICE

## 2024-04-05 PROCEDURE — 2720000007 HC OR 272 NO HCPCS: Performed by: GENERAL PRACTICE

## 2024-04-05 PROCEDURE — 30465 REPAIR NASAL STENOSIS: CPT | Performed by: GENERAL PRACTICE

## 2024-04-05 PROCEDURE — 2500000001 HC RX 250 WO HCPCS SELF ADMINISTERED DRUGS (ALT 637 FOR MEDICARE OP): Performed by: ANESTHESIOLOGY

## 2024-04-05 PROCEDURE — 36415 COLL VENOUS BLD VENIPUNCTURE: CPT | Performed by: ANESTHESIOLOGY

## 2024-04-05 RX ORDER — CELECOXIB 200 MG/1
200 CAPSULE ORAL 2 TIMES DAILY
Qty: 30 CAPSULE | Refills: 0 | Status: SHIPPED | OUTPATIENT
Start: 2024-04-05

## 2024-04-05 RX ORDER — SODIUM CHLORIDE, SODIUM LACTATE, POTASSIUM CHLORIDE, CALCIUM CHLORIDE 600; 310; 30; 20 MG/100ML; MG/100ML; MG/100ML; MG/100ML
50 INJECTION, SOLUTION INTRAVENOUS CONTINUOUS
Status: DISCONTINUED | OUTPATIENT
Start: 2024-04-05 | End: 2024-04-05 | Stop reason: HOSPADM

## 2024-04-05 RX ORDER — LIDOCAINE HYDROCHLORIDE AND EPINEPHRINE 10; 10 MG/ML; UG/ML
INJECTION, SOLUTION INFILTRATION; PERINEURAL AS NEEDED
Status: DISCONTINUED | OUTPATIENT
Start: 2024-04-05 | End: 2024-04-05 | Stop reason: HOSPADM

## 2024-04-05 RX ORDER — MORPHINE SULFATE 2 MG/ML
2 INJECTION, SOLUTION INTRAMUSCULAR; INTRAVENOUS EVERY 5 MIN PRN
Status: DISCONTINUED | OUTPATIENT
Start: 2024-04-05 | End: 2024-04-05 | Stop reason: HOSPADM

## 2024-04-05 RX ORDER — LIDOCAINE HCL/PF 100 MG/5ML
SYRINGE (ML) INTRAVENOUS AS NEEDED
Status: DISCONTINUED | OUTPATIENT
Start: 2024-04-05 | End: 2024-04-05

## 2024-04-05 RX ORDER — MIDAZOLAM HYDROCHLORIDE 1 MG/ML
INJECTION, SOLUTION INTRAMUSCULAR; INTRAVENOUS AS NEEDED
Status: DISCONTINUED | OUTPATIENT
Start: 2024-04-05 | End: 2024-04-05

## 2024-04-05 RX ORDER — MUPIROCIN 20 MG/G
OINTMENT TOPICAL AS NEEDED
Status: DISCONTINUED | OUTPATIENT
Start: 2024-04-05 | End: 2024-04-05 | Stop reason: HOSPADM

## 2024-04-05 RX ORDER — CEFAZOLIN SODIUM 1 G/50ML
1 SOLUTION INTRAVENOUS ONCE
Status: COMPLETED | OUTPATIENT
Start: 2024-04-05 | End: 2024-04-05

## 2024-04-05 RX ORDER — ONDANSETRON HYDROCHLORIDE 2 MG/ML
4 INJECTION, SOLUTION INTRAVENOUS ONCE AS NEEDED
Status: DISCONTINUED | OUTPATIENT
Start: 2024-04-05 | End: 2024-04-05 | Stop reason: HOSPADM

## 2024-04-05 RX ORDER — PROPOFOL 10 MG/ML
INJECTION, EMULSION INTRAVENOUS AS NEEDED
Status: DISCONTINUED | OUTPATIENT
Start: 2024-04-05 | End: 2024-04-05

## 2024-04-05 RX ORDER — DEXAMETHASONE SODIUM PHOSPHATE 100 MG/10ML
INJECTION INTRAMUSCULAR; INTRAVENOUS AS NEEDED
Status: DISCONTINUED | OUTPATIENT
Start: 2024-04-05 | End: 2024-04-05

## 2024-04-05 RX ORDER — FENTANYL CITRATE 50 UG/ML
INJECTION, SOLUTION INTRAMUSCULAR; INTRAVENOUS AS NEEDED
Status: DISCONTINUED | OUTPATIENT
Start: 2024-04-05 | End: 2024-04-05

## 2024-04-05 RX ORDER — ONDANSETRON HYDROCHLORIDE 2 MG/ML
4 INJECTION, SOLUTION INTRAVENOUS ONCE
Status: COMPLETED | OUTPATIENT
Start: 2024-04-05 | End: 2024-04-05

## 2024-04-05 RX ORDER — ROCURONIUM BROMIDE 50 MG/5 ML
SYRINGE (ML) INTRAVENOUS AS NEEDED
Status: DISCONTINUED | OUTPATIENT
Start: 2024-04-05 | End: 2024-04-05

## 2024-04-05 RX ORDER — ACETAMINOPHEN 325 MG/1
975 TABLET ORAL ONCE
Status: COMPLETED | OUTPATIENT
Start: 2024-04-05 | End: 2024-04-05

## 2024-04-05 RX ORDER — FAMOTIDINE 10 MG/ML
20 INJECTION INTRAVENOUS ONCE
Status: COMPLETED | OUTPATIENT
Start: 2024-04-05 | End: 2024-04-05

## 2024-04-05 RX ORDER — MUPIROCIN 20 MG/G
1 OINTMENT TOPICAL 2 TIMES DAILY
Qty: 22 G | Refills: 0 | Status: SHIPPED | OUTPATIENT
Start: 2024-04-05

## 2024-04-05 RX ORDER — OXYMETAZOLINE HCL 0.05 %
2 SPRAY, NON-AEROSOL (ML) NASAL EVERY 12 HOURS PRN
Qty: 30 ML | Refills: 0 | Status: SHIPPED | OUTPATIENT
Start: 2024-04-05 | End: 2024-04-19

## 2024-04-05 RX ORDER — SODIUM CITRATE AND CITRIC ACID MONOHYDRATE 334; 500 MG/5ML; MG/5ML
30 SOLUTION ORAL ONCE
Status: COMPLETED | OUTPATIENT
Start: 2024-04-05 | End: 2024-04-05

## 2024-04-05 RX ORDER — DOCUSATE SODIUM 100 MG/1
100 CAPSULE, LIQUID FILLED ORAL 2 TIMES DAILY
Qty: 30 CAPSULE | Refills: 0 | Status: SHIPPED | OUTPATIENT
Start: 2024-04-05

## 2024-04-05 RX ORDER — OXYCODONE HYDROCHLORIDE 5 MG/1
5 TABLET ORAL EVERY 6 HOURS PRN
Qty: 15 TABLET | Refills: 0 | Status: SHIPPED | OUTPATIENT
Start: 2024-04-05 | End: 2024-04-12

## 2024-04-05 RX ORDER — SODIUM CHLORIDE 0.9 G/100ML
IRRIGANT IRRIGATION AS NEEDED
Status: DISCONTINUED | OUTPATIENT
Start: 2024-04-05 | End: 2024-04-05 | Stop reason: HOSPADM

## 2024-04-05 RX ORDER — MORPHINE SULFATE 4 MG/ML
4 INJECTION INTRAVENOUS EVERY 5 MIN PRN
Status: DISCONTINUED | OUTPATIENT
Start: 2024-04-05 | End: 2024-04-05 | Stop reason: HOSPADM

## 2024-04-05 RX ORDER — ACETAMINOPHEN 500 MG
1000 TABLET ORAL EVERY 6 HOURS PRN
Qty: 30 TABLET | Refills: 0 | Status: SHIPPED | OUTPATIENT
Start: 2024-04-05 | End: 2024-04-15

## 2024-04-05 RX ORDER — OXYMETAZOLINE HCL 0.05 %
2 SPRAY, NON-AEROSOL (ML) NASAL EVERY 12 HOURS PRN
Status: DISCONTINUED | OUTPATIENT
Start: 2024-04-05 | End: 2024-04-05 | Stop reason: HOSPADM

## 2024-04-05 RX ORDER — METOCLOPRAMIDE HYDROCHLORIDE 5 MG/ML
10 INJECTION INTRAMUSCULAR; INTRAVENOUS ONCE
Status: COMPLETED | OUTPATIENT
Start: 2024-04-05 | End: 2024-04-05

## 2024-04-05 RX ADMIN — SUGAMMADEX 200 MG: 100 INJECTION, SOLUTION INTRAVENOUS at 08:45

## 2024-04-05 RX ADMIN — ONDANSETRON 4 MG: 2 INJECTION INTRAMUSCULAR; INTRAVENOUS at 06:42

## 2024-04-05 RX ADMIN — SODIUM CITRATE AND CITRIC ACID MONOHYDRATE 30 ML: 500; 334 SOLUTION ORAL at 06:41

## 2024-04-05 RX ADMIN — OXYMETAZOLINE HYDROCHLORIDE 2 SPRAY: 0.05 SPRAY NASAL at 06:53

## 2024-04-05 RX ADMIN — FAMOTIDINE 20 MG: 10 INJECTION, SOLUTION INTRAVENOUS at 06:42

## 2024-04-05 RX ADMIN — CEFAZOLIN SODIUM 1 G: 1 INJECTION, SOLUTION INTRAVENOUS at 07:29

## 2024-04-05 RX ADMIN — SODIUM CHLORIDE, POTASSIUM CHLORIDE, SODIUM LACTATE AND CALCIUM CHLORIDE 50 ML/HR: 600; 310; 30; 20 INJECTION, SOLUTION INTRAVENOUS at 06:41

## 2024-04-05 RX ADMIN — METOCLOPRAMIDE 10 MG: 5 INJECTION, SOLUTION INTRAMUSCULAR; INTRAVENOUS at 06:42

## 2024-04-05 RX ADMIN — FENTANYL CITRATE 100 MCG: 50 INJECTION INTRAMUSCULAR; INTRAVENOUS at 07:37

## 2024-04-05 RX ADMIN — MIDAZOLAM 2 MG: 1 INJECTION INTRAMUSCULAR; INTRAVENOUS at 07:37

## 2024-04-05 RX ADMIN — PROPOFOL 200 MG: 10 INJECTION, EMULSION INTRAVENOUS at 07:37

## 2024-04-05 RX ADMIN — ACETAMINOPHEN 975 MG: 325 TABLET ORAL at 06:41

## 2024-04-05 RX ADMIN — DEXAMETHASONE SODIUM PHOSPHATE 8 MG: 4 INJECTION, SOLUTION INTRAMUSCULAR; INTRAVENOUS at 06:41

## 2024-04-05 RX ADMIN — DEXAMETHASONE SODIUM PHOSPHATE 4 MG: 10 INJECTION INTRAMUSCULAR; INTRAVENOUS at 07:37

## 2024-04-05 RX ADMIN — Medication 50 MG: at 07:37

## 2024-04-05 RX ADMIN — LIDOCAINE HYDROCHLORIDE 100 MG: 20 INJECTION, SOLUTION INTRAVENOUS at 07:37

## 2024-04-05 SDOH — HEALTH STABILITY: MENTAL HEALTH: CURRENT SMOKER: 0

## 2024-04-05 ASSESSMENT — COLUMBIA-SUICIDE SEVERITY RATING SCALE - C-SSRS
2. HAVE YOU ACTUALLY HAD ANY THOUGHTS OF KILLING YOURSELF?: NO
6. HAVE YOU EVER DONE ANYTHING, STARTED TO DO ANYTHING, OR PREPARED TO DO ANYTHING TO END YOUR LIFE?: NO

## 2024-04-05 ASSESSMENT — PAIN SCALES - GENERAL
PAINLEVEL_OUTOF10: 0 - NO PAIN
PAINLEVEL_OUTOF10: 0 - NO PAIN
PAIN_LEVEL: 1
PAINLEVEL_OUTOF10: 0 - NO PAIN

## 2024-04-05 ASSESSMENT — PAIN - FUNCTIONAL ASSESSMENT: PAIN_FUNCTIONAL_ASSESSMENT: 0-10

## 2024-04-05 NOTE — OP NOTE
Septoplasty, Turbinoplasty, repair nasal valve, clarifix bilateral (Clarifix ) (B), . (B), . (B), . (B) Operative Note     Date: 2024  OR Location: [facility]    Name: Turner Knott, : 1985, Age: 38 y.o., MRN: [id number], Sex: male    Diagnosis  Pre-op Diagnosis     * Deviated septum [J34.2]     * Hypertrophy of both inferior nasal turbinates [J34.3]     * Nasal congestion [R09.81]     * Collapse of nasal valve [M95.0] Post-op Diagnosis     * Deviated septum [J34.2]     * Hypertrophy of both inferior nasal turbinates [J34.3]     * Nasal congestion [R09.81]     * Collapse of nasal valve [M95.0]     Procedures  Septoplasty, Turbinoplasty, repair nasal valve, clarifix bilateral (Clarifix )  88643 - NE SEPTOPLASTY/SUBMUCOUS RESECJ W/WO CARTILAGE GRF    .  80072 - NE SUBMUCOUS RESCJ INFERIOR TURBINATE PRTL/COMPL    .  26115 - NE EXCISION/DESTRUCTION INTRANASAL LESION INT APPR    .  67372 - NE REPAIR NASAL VESTIBULAR STENOSIS      Surgeons      * Nico Mckinney - Primary    Resident/Fellow/Other Assistant:  Surgeon(s) and Role:    Procedure Summary  Anesthesia: General  ASA: II  Anesthesia Staff: CRNA: ISRAEL Friedman-CRNA  SRNA: Trina Mayorga  Estimated Blood Loss: 20 mL  Intra-op Medications:   Administrations occurring from 0730 to 0930 on 24:   Medication Name Total Dose   lidocaine-epinephrine (Xylocaine W/EPI) 1 %-1:100,000 injection 6 mL   oxymetazoline (Afrin) 0.05 % nasal spray 2 spray 15 spray              Anesthesia Record               Intraprocedure I/O Totals       None           Specimen: No specimens collected     Staff:   Circulator: Leandra Garduno RN  Scrub Person: Becky Reyes         Drains and/or Catheters: * None in log *    Tourniquet Times:         Implants:     Findings: severe leftward deviated septum, R>L turbinate hypertrophy.  Clarifix performed.  B/l latera graft performed, no latera.     Indications: Turner Knott is an 38 y.o. male who is having surgery for  Deviated septum [J34.2]  Hypertrophy of both inferior nasal turbinates [J34.3]  Nasal congestion [R09.81]  Collapse of nasal valve [M95.0].      The patient was seen in the preoperative area. The risks, benefits, complications, treatment options, non-operative alternatives, expected recovery and outcomes were discussed with the patient. The possibilities of reaction to medication, pulmonary aspiration, injury to surrounding structures, bleeding, recurrent infection, the need for additional procedures, failure to diagnose a condition, and creating a complication requiring transfusion or operation were discussed with the patient. The patient concurred with the proposed plan, giving informed consent.  The site of surgery was properly noted/marked if necessary per policy. The patient has been actively warmed in preoperative area. Preoperative antibiotics have been ordered and given within 1 hours of incision. Venous thrombosis prophylaxis are not indicated.    Procedure Details:      Procedure   The patient was met in the preoperative holding area, and the operative plan was discussed.  All questions were answered.      The patient was brought back to the operative suite by anesthesia staff, laid supine on the operating table and general endotracheal anesthesia was induced.  The patient was prepped and draped in the usual standard fashion and an operative pause was performed.  A total of 9cc of 1% lidocaine with 1:100,000 epinephrine was injected in the submucoperichondreal plane bilaterally, as well as into the inferior turbinates.  Afrin soaked pledgets were then inserted into the nose, and allowed to sit while the surgical team scrubbed.      Attention was first turned to the inferior turbinates.  A 2mm straight shot microdebrider with the turbinate blade was used to perform a submucous resection of the right and then left inferior turbinates with good effect.  A Coyle elevator was then used to outfracture the  inferior turbinates.      After this, attention was turned to the septum.  Left   sided hemitransfixion incision was made using a 15 blade to access the submucoperichondrial plane.  This plane was then further developed using a alyce and then rosalee elevator.  The dissection was carried back to and beyond the bony/cartilagenous junction, down to the floor of the nose, and high into the nasal valve.   Of note, the dissection plane was found to be  easily raised  .  The caudal aspect of the septum was then reflected through the hemitranfixion incision and a 15 blade was used to access the submucoperichondrial plane on the contralateral side.  A similar dissection of the plane was peformed on this side.  After this, the deviated and obstructive septal cartilage and bone was reshaped and/or removed using a combination of the septal D knife, double action septal scissors, through biting forceps, and nicola forceps.  Care was taken to leave an L strut of dorsal/caudal septum of >1.5cm width.  Care was taken not to create or propagate fractures superiorly to endanger the skull base.  Superior and then inferior releasing incisions were made in the septum prior to any grasping force was applied.  After repair of the deviated septum, the overall septum was felt to be straight and midline.     A 0 degree endoscope was used to evaluate the nose and guide placement of the clarifix device.  The clarifix was prepped and then inserted into the left nasal cavity.  It was placed under the middle turbinate low against the basal lamella.  The device was activated for 30 seconds, then deactivated and left to thaw for 90 seconds.  The device was gently removed and there was appropriate blanching and no mucosal trauma.  A similar procedure was then performed on the right.    Alar luann grafts of 1.25cm x 5mm were fashioned from a straight section of septal cartilage.  Appropriate pockets were made through an incision inside the  nasal vestibule and then a pocket out to the nasal aperture was made.  The graft was placed into the pocket and then the pocket was closed with 5-0 fast gut.       The nasal passages were widely patent bilaterally.  There  were left side only mucosal rent .  Quilting sutures made of 4-0 fast gut were placed to approximate the septal mucosa and close any mucosal rents present.  The hemitransfixion incision was closed with 4-0 fast gut.  Oliveira splints coated in bactroban were placed in the nasal cavity under direct visualization, and secured in place using 2-0 prolene.  An orogastric tube was passed to suction out the contents of the esophagus and stomach.  The patient was then turned back over to the anesthesia service for extubation and transport to the post-operative recovery area.     Complications:  None; patient tolerated the procedure well.    Disposition: PACU - hemodynamically stable.  Condition: stable         Additional Details:      Attending Attestation:     Nico Mckinney  Phone Number: [phone]

## 2024-04-05 NOTE — DISCHARGE INSTRUCTIONS
Instructions:  Return to clinic in one week for removal of nasal splints.  Use nasal saline several times a day to avoid nasal crusting. Avoid blowing your nose. Cough and sneeze w/ mouth open.  Usually splints are left in the nose after the procedure, so you will not be able to breathe very well through your nose.  Minimal bleeding from nose is expected for first 2-3 days post operatively. Use Afrin twice a day if you are having bleeding or congestion for the first 3 days after surgery.  Use nasal slings of gauze to collect nasal drainage as needed.  Swelling and bruising is not unusual and you may develop “black eyes” which will resolve.  Your nose will feel more congested because of blood and mucus until splints are removed.  Reduced energy for several days to weeks postoperatively is common.  You should be able to return to work in 5 to 7 days, unless you work in a dirty or polluted environment.   Your nose is “fragile” for at least 6 weeks; avoid trauma to your nose in this period.    Use provided pain medications as needed for pain. You should take colace to help prevent constipation if you are taking narcotics regularly.  You can shower as normal. Avoid strenuous activity for 2 full weeks. Gradually increase activity thereafter according to your comfort level.  Plan to guard your nose from trauma for the next 6 weeks.  Bacterial infection is a concern with the splints in place so you will be provided with antibiotics, however, if you develop severe pain not controlled by medication, fever >101 degrees, skin discoloration (other than bruising), or pass out within a week of surgery, please contact us.  Keep incision line under nose moist using bactroban.  Gently cleaning up crusting at this site using dilute hydrogen peroxide (mix 50:50 with water) on a Qtip will help the healing process.    .

## 2024-04-05 NOTE — H&P
History Of Present Illness  Turner Knott is a 38 y.o. male presenting for evaluation of chronic nasal obstruction/congestion as well as snoring and possible EBER.     Reports longstanding nasal obstruction/congestion.     Reports waxing/waning symptoms of allergic rhinitis over time.  Limited imporvement with use of flonase and prior heavy use of antihistamines.      Reports flares of rhinosinusitis multiple times per year.      No prior nasal trauma/surgery.     Reports use of nasal cones with good effect, but often fall out overnight.      No PSG on file, prefers to avoid CPAP.                     Past Medical History  He has no past medical history on file.     Surgical History  He has a past surgical history that includes No past surgeries.     Social History  He reports that he has never smoked. He has never used smokeless tobacco. No history on file for alcohol use and drug use.     Family History  Family History          Family History   Problem Relation Name Age of Onset    No Known Problems Mother        Diabetes Father                Allergies  Shellfish derived     Review of Systems  ROS: Pertinent positives as noted in HPI.     - CONSTITUTIONAL: Does not report weight loss, fever or chills.     - HEENT:   Ear: Does not report tinnitus, vertigo, hearing loss, otalgia, otorrhea  Nose: Does not report  ,  , epistaxis, decreased smell  Throat: Does not report pain, dysphagia, odynophagia  Larynx: Does not report hoarseness,  difficulty breathing, pain with speaking (odynophonia)  Neck: Does not report new masses, pain, swelling  Face: Does not report sinus pain, pressure, swelling, numbness, weakness      - RESPIRATORY: Does not report SOB or cough.     - CV: Does not report palpitations or chest pain.      - GI: Does not report abdominal pain, nausea, vomiting or diarrhea.     - : Does not report dysuria or urinary frequency.     - MSK: Does not report myalgia or joint pain.     - SKIN: Does not  report rash or pruritus.     - NEUROLOGICAL: Does not report headache or syncope.     - PSYCHIATRIC: Does not report recent changes in mood. Does not report anxiety or depression.           Physical Exam:      GENERAL:   Alert & Oriented to person, place and time; Normal affect and appearance. Well developed and well nourished. Conversant & cooperative with examination.      HEAD:   Normocephalic, atraumatic. No sinus tenderness to palpation. Normal parotid bilaterally. Normal facial strength.      NEUROLOGIC:   Cranial nerves II-XII grossly intact, gait WNL. Normal mood and affect.     EYES:   Extraocular movements intact. Pupils equal, round, reactive to light and accommodation. No nystagmus, no ptosis. no scleral injection.     EAR:   Normal auricle. No discomfort or TTP with manipulation.   Handheld otoscopic exam showed normal external auditory canals bilaterally. No purulence or EAC inflammation. Minimal cerumen.   Right tympanic membrane clear and mobile without evidence of perforation, retraction or middle ear effusion.   Left tympanic membrane clear and mobile without evidence of perforation, retraction or middle ear effusion.      NOSE:   No external deformity. No external nasal lesions, lacerations, or scars. Nasal tip symmetrical with normal nasal valves.  Dynamic collapse R>L with improvement with modified rosalee.    Nasal cavity with mid/posterior leftward septum, edematous  mucosa and turbinates. No lesions, masses, purulence or polyps.      OC/OP:   Mucous membranes moist, no masses, lesions or exudates.   Normal tongue, floor of mouth, teeth, gums, lips. Normal posterior pharyngeal wall.    Normal tonsils without erythema, exudate or obvious calculi      NECK:   No neck masses or thyroid enlargement. Trachea midline. No tenderness to palpation     LYMPHATIC:   No cervical lymphadenopathy.      RESPIRATORY:   Symmetric chest elevation & no retractions. No significant hoarseness. No increased work of  breathing.     CV:   No clubbing or cyanosis. No obvious edema     Skin:   No facial rashes, vesicles or lesions.      Extremities:   No gross abnormalities        Clinic Procedure           Information review:  External sources (notes, imaging, lab results) listed below personally reviewed to aid in medical decision making process.  - CT sinus  10/5/23 reviewed showing leftward deviated septum, paradoxical MT, R>L inferior turbinate hypertrophy, no significant sinusitis.      -[facility] Aaron Behzad 8/18/23 note reviewed  - [facility] Oscar Gayle 7/18/23 note reviewed        Assessment/Plan   Problem List Items Addressed This Visit               ICD-10-CM             ENT     Deviated septum J34.2     Relevant Orders     Case Request Operating Room: Repair Septum Nasal Cavity, Repair Septum Nasal Cavity with Reduction Turbinate, Excision Lesion Nasal Cavity, Repair Nasal Valve (Completed)     Hypertrophy, nasal, turbinate J34.3     Relevant Orders     Case Request Operating Room: Repair Septum Nasal Cavity, Repair Septum Nasal Cavity with Reduction Turbinate, Excision Lesion Nasal Cavity, Repair Nasal Valve (Completed)     Nasal valve collapse M95.0     Relevant Orders     Case Request Operating Room: Repair Septum Nasal Cavity, Repair Septum Nasal Cavity with Reduction Turbinate, Excision Lesion Nasal Cavity, Repair Nasal Valve (Completed)      Other Visit Diagnoses           Codes     Acute recurrent sinusitis, unspecified location    -  Primary J01.91     Chronic rhinitis     J31.0             38yoM with snoring and likely EBER in the setting of nasal obstructino/congetion.    Discussed possible PSG and consideration of CPAP but pt declines for now.  Discussed possible ongoing medical management for rhinitis.   Discussed consideration of septoturbinoplasty, possible latera implant, possible clarifix procedure and the pt would like to proceed with this plan.      Risks, benefits and indications of the procedure were discussed.   This included discussion of risks of pain, bleeding, infection, scarring with poor functional or cosmetic result, need for future procedures, or damage to surrounding structures to include regional blood vessels and nerves.  In particular, the risk of CSF leak, ongoing obstruction was highlighted.  All questions were answered.  The patient/caregiver indicated understanding of the discussion and elected to proceed with the procedure.  Will schedule as available.        Follow up:  -plan for follow up in clinic as needed     All of the above findings, impressions, treatment planning and follow up plans were discussed with the patient who indicated understanding.  the patient was instructed to contact or return to clinic sooner if symptoms/signs persist or worsen despite the above management.       Nico Mckinney MD  Otolaryngology - Head and Neck Surgery

## 2024-04-05 NOTE — ANESTHESIA POSTPROCEDURE EVALUATION
Patient: Turner Knott    Procedure Summary       Date: 04/05/24 Room / Location: [facility]  / [facility]    Anesthesia Start: 0729 Anesthesia Stop: 0902    Procedures:       Septoplasty, Turbinoplasty, repair nasal valve, clarifix bilateral (Clarifix ) (Bilateral)      . (Bilateral)      . (Bilateral)      . (Bilateral) Diagnosis:       Deviated septum      Hypertrophy of both inferior nasal turbinates      Nasal congestion      Collapse of nasal valve      (Deviated septum [J34.2])      (Hypertrophy of both inferior nasal turbinates [J34.3])      (Nasal congestion [R09.81])      (Collapse of nasal valve [M95.0])    Surgeons: Nico Mckinney MD Responsible Provider: JESU Friedman    Anesthesia Type: general ASA Status: 2            Anesthesia Type: general    Vitals Value Taken Time   /90 04/05/24 1000   Temp 36.6 °C (97.8 °F) 04/05/24 0900   Pulse 68 04/05/24 0958   Resp 11 04/05/24 1000   SpO2 97 % 04/05/24 0958   Vitals shown include unvalidated device data.    Anesthesia Post Evaluation    Patient location during evaluation: PACU  Patient participation: complete - patient participated  Level of consciousness: awake and alert  Pain score: 1  Pain management: satisfactory to patient  Airway patency: patent  Cardiovascular status: acceptable and hemodynamically stable  Respiratory status: room air and acceptable  Hydration status: acceptable  Postoperative Nausea and Vomiting: none    No notable events documented.

## 2024-04-05 NOTE — ANESTHESIA PREPROCEDURE EVALUATION
Patient: Turner Knott    Procedure Information       Date/Time: 04/05/24 0730    Procedures:       Septoplasty, Turbinoplasty, repair nasal valve, clarifix bilateral (Clarifix ) (Bilateral) - Septoplasty   entire surgery time 2 hours includes room turnover time      . (Bilateral) - Turbinoplasty      . (Bilateral) - clarifix bilateral      . (Bilateral)    Location: [facility] 07 / [facility]    Surgeons: Nico Mckinney MD            Relevant Problems   Neuro   (+) Anxiety   (+) Depression       Clinical information reviewed:   Tobacco  Allergies  Meds  Problems  Med Hx  Surg Hx   Fam Hx  Soc   Hx        NPO Detail:  NPO/Void Status  Carbohydrate Drink Given Prior to Surgery? : N  Date of Last Liquid: 04/04/24  Time of Last Liquid: 2315  Date of Last Solid: 04/04/24  Time of Last Solid: 2130  Last Intake Type: Clear fluids  Time of Last Void: 0621         Physical Exam    Airway  Mallampati: III  TM distance: >3 FB  Neck ROM: full     Cardiovascular - normal exam     Dental - normal exam     Pulmonary - normal exam     Abdominal            Anesthesia Plan    History of general anesthesia?: yes  History of complications of general anesthesia?: no    ASA 2     general     The patient is not a current smoker.    intravenous induction   Anesthetic plan and risks discussed with patient.  Use of blood products discussed with patient who consented to blood products.    Plan discussed with CRNA.

## 2024-04-08 ASSESSMENT — PAIN SCALES - GENERAL: PAINLEVEL_OUTOF10: 0 - NO PAIN

## 2024-04-10 ENCOUNTER — OFFICE VISIT (OUTPATIENT)
Dept: OTOLARYNGOLOGY | Facility: CLINIC | Age: 39
End: 2024-04-10
Payer: COMMERCIAL

## 2024-04-10 VITALS — BODY MASS INDEX: 28.48 KG/M2 | WEIGHT: 210 LBS

## 2024-04-10 DIAGNOSIS — Z98.890 S/P NASAL SEPTOPLASTY: Primary | ICD-10-CM

## 2024-04-10 PROCEDURE — 1036F TOBACCO NON-USER: CPT | Performed by: GENERAL PRACTICE

## 2024-04-10 PROCEDURE — 99024 POSTOP FOLLOW-UP VISIT: CPT | Performed by: GENERAL PRACTICE

## 2024-04-10 NOTE — PROGRESS NOTES
CC: postoperative visit      HPI: The pt presents for postoperative evaluation s/p septoturbinoplasty, alar batten grafts, clarifix procedure on DOS [id number].    No acute complaints currently.  Stable postoperative course.  No fevers, chills, N/V, SOB.  No significant bleeding.  Back to regular activity levels.  No dietary restrictions.    PMHx: Reviewed, see previous note for full details.  No hx of significant cardiopulmonary health disorders or bleeding dyscrasias.   PSHx: Reviewed, see previous note for full details.  No hx of problems with GETA or complications from surgery.    Meds:  reviewed, see medication reconciliation in [employer]      GENERAL:  Alert and oriented; normal affect and appearance.  Well developed and well nourished.  Conversant and cooperative with examination.     HEAD:  Normocephalic and atraumatic.     NEUROLOGIC:  Cranial nerves II-XII grossly intact.  Gait WNL.  Normal mood and affect.   EYES:  EOMI, PERRL.  No nystagmus or ptosis noted.   EARS:  Normal auricle.  Otoscopic exam showed normal EACs without purulence or inflammation.  Tympanic membranes clear and mobile without evidence of perforation, retraction or effusion.    NOSE:  No external deformity.  Oliveira splints removed. Nasal cavity clear with midline septum, healing  mucosa and turbinates.  No lesions, masses, purulence or polyps noted.     OC/OP: Mucous membranes moist without masses, lesions or exudates.  Normal tongue, floor of mouth, teeth, gums, and lips.  Normal tonsils and posterior oropharyngeal walls.     NECK:  No visible or palpable neck masses.  No tenderness, edema, or crepitus.  Trachea midline.   LYMPHATIC:  No cervical lymphadenopathy   RESPIRATORY:  No stridor or hoarseness.  No increased work of breathing or retractions. Symmetric chest elevation.   CV:  No clubbing or cyanosis   SKIN:  No facial rashes, vesicles or lesions.         A/P: Overall doing well without evidence of post-surgical complications.   Counseled on local wound care and expected postoperative healing course.  Plan for ENT f/u  in 2mo .

## 2024-06-20 ENCOUNTER — APPOINTMENT (OUTPATIENT)
Dept: OTOLARYNGOLOGY | Facility: CLINIC | Age: 39
End: 2024-06-20
Payer: COMMERCIAL

## 2024-08-14 ENCOUNTER — APPOINTMENT (OUTPATIENT)
Dept: OTOLARYNGOLOGY | Facility: CLINIC | Age: 39
End: 2024-08-14
Payer: COMMERCIAL

## 2024-08-14 VITALS — WEIGHT: 210 LBS | BODY MASS INDEX: 28.48 KG/M2

## 2024-08-14 DIAGNOSIS — J30.9 ALLERGIC RHINITIS, UNSPECIFIED SEASONALITY, UNSPECIFIED TRIGGER: ICD-10-CM

## 2024-08-14 DIAGNOSIS — Z98.890 S/P NASAL SEPTOPLASTY: Primary | ICD-10-CM

## 2024-08-14 PROCEDURE — 1036F TOBACCO NON-USER: CPT | Performed by: GENERAL PRACTICE

## 2024-08-14 PROCEDURE — 99213 OFFICE O/P EST LOW 20 MIN: CPT | Performed by: GENERAL PRACTICE

## 2024-08-14 RX ORDER — AZELASTINE 1 MG/ML
2 SPRAY, METERED NASAL 2 TIMES DAILY
Qty: 30 ML | Refills: 2 | Status: SHIPPED | OUTPATIENT
Start: 2024-08-14 | End: 2025-08-14

## 2024-08-14 NOTE — PROGRESS NOTES
Otolaryngology - Head and Neck Surgery Outpatient New Patient Visit Note        Assessment/Plan:   Problem List Items Addressed This Visit             ICD-10-CM       ENT    Chronic rhinitis J31.0    Relevant Medications    azelastine (Astelin) 137 mcg (0.1 %) nasal spray     Other Visit Diagnoses         Codes    S/P nasal septoplasty    -  Primary Z98.890            Doing well, with some intermittent allergy symptoms.  Discussed trial of astelin.        Follow up:  -plan for follow up in clinic as needed    All of the above findings, impressions, treatment planning and follow up plans were discussed with the patient who indicated understanding.  the patient was instructed to contact or return to clinic sooner if symptoms/signs persist or worsen despite the above management.      Nico Mckinney MD  Otolaryngology - Head and Neck Surgery            History Of Present Illness  Turner Knott is a 39 y.o. male presenting for follow up after prior septoturbinoplasty, alar batten grafts, clarifix.    Overall doing well with good nasal airflow and no baseline rhinitis.   Notes intermittent allergy symptoms which he tolerates as he doesn't like how PO antihistamines makes him feel.     Recall    The pt presents for postoperative evaluation s/p septoturbinoplasty, alar batten grafts, clarifix procedure on DOS [id number].    No acute complaints currently.  Stable postoperative course.  No fevers, chills, N/V, SOB.  No significant bleeding.  Back to regular activity levels.  No dietary restrictions.            Past Medical History  He has a past medical history of Anxiety and Depression.    Surgical History  He has a past surgical history that includes No past surgeries; [place] tooth extraction; and Nasal septoplasty w/ turbinoplasty (Bilateral, 04/05/2024).     Social History  He reports that he has never smoked. He has never used smokeless tobacco. He reports current alcohol use of about 10.0 standard drinks of alcohol per  week. He reports current drug use. Drug: Marijuana.    Family History  Family History   Problem Relation Name Age of Onset    No Known Problems Mother      Diabetes Father          Allergies  Shellfish derived    Review of Systems  ROS: Pertinent positives as noted in HPI.    - CONSTITUTIONAL: Does not report weight loss, fever or chills.    - HEENT:   Ear: Does not report tinnitus, vertigo, hearing loss, otalgia, otorrhea  Nose: Does not report  ,  , epistaxis, decreased smell  Throat: Does not report pain, dysphagia, odynophagia  Larynx: Does not report hoarseness,  difficulty breathing, pain with speaking (odynophonia)  Neck: Does not report new masses, pain, swelling  Face: Does not report sinus pain, pressure, swelling, numbness, weakness     - RESPIRATORY: Does not report SOB or cough.    - CV: Does not report palpitations or chest pain.     - GI: Does not report abdominal pain, nausea, vomiting or diarrhea.    - : Does not report dysuria or urinary frequency.    - MSK: Does not report myalgia or joint pain.    - SKIN: Does not report rash or pruritus.    - NEUROLOGICAL: Does not report headache or syncope.    - PSYCHIATRIC: Does not report recent changes in mood. Does not report anxiety or depression.         Physical Exam:     GENERAL:   Alert & Oriented to person, place and time; Normal affect and appearance. Well developed and well nourished. Conversant & cooperative with examination.     HEAD:   Normocephalic, atraumatic. No sinus tenderness to palpation. Normal parotid bilaterally. Normal facial strength.     NEUROLOGIC:   Cranial nerves II-XII grossly intact, gait WNL. Normal mood and affect.    EYES:   Extraocular movements intact. Pupils equal, round, reactive to light and accommodation. No nystagmus, no ptosis. no scleral injection.    EAR:   Normal auricle. No discomfort or TTP with manipulation.   Handheld otoscopic exam showed normal external auditory canals bilaterally. No purulence or EAC  inflammation. Minimal cerumen.   Right tympanic membrane clear and mobile without evidence of perforation, retraction or middle ear effusion.   Left tympanic membrane clear and mobile without evidence of perforation, retraction or middle ear effusion.     NOSE:   No external deformity. No external nasal lesions, lacerations, or scars. Nasal tip symmetrical with normal nasal valves.   Nasal cavity with essentially midline septum, normal mucosa and turbinates. No lesions, masses, purulence or polyps.     OC/OP:   Mucous membranes moist, no masses, lesions or exudates.   Normal tongue, floor of mouth, teeth, gums, lips. Normal posterior pharyngeal wall.    Normal tonsils without erythema, exudate or obvious calculi     NECK:   No neck masses or thyroid enlargement. Trachea midline. No tenderness to palpation    LYMPHATIC:   No cervical lymphadenopathy.     RESPIRATORY:   Symmetric chest elevation & no retractions. No significant hoarseness. No increased work of breathing.    CV:   No clubbing or cyanosis. No obvious edema    Skin:   No facial rashes, vesicles or lesions.     Extremities:   No gross abnormalities      Clinic Procedure        Information review:  External sources (notes, imaging, lab results) listed below personally reviewed to aid in medical decision making process.  -  -  -

## 2024-09-27 ENCOUNTER — OFFICE VISIT (OUTPATIENT)
Dept: URGENT CARE | Age: 39
End: 2024-09-27
Payer: COMMERCIAL

## 2024-09-27 VITALS
HEART RATE: 67 BPM | TEMPERATURE: 97.3 F | DIASTOLIC BLOOD PRESSURE: 65 MMHG | SYSTOLIC BLOOD PRESSURE: 126 MMHG | RESPIRATION RATE: 16 BRPM | BODY MASS INDEX: 24.95 KG/M2 | WEIGHT: 205 LBS | OXYGEN SATURATION: 97 %

## 2024-09-27 DIAGNOSIS — J02.9 SORE THROAT: ICD-10-CM

## 2024-09-27 DIAGNOSIS — J02.0 STREP THROAT: Primary | ICD-10-CM

## 2024-09-27 LAB — POC RAPID STREP: POSITIVE

## 2024-09-27 RX ORDER — CEPHALEXIN 500 MG/1
500 CAPSULE ORAL 2 TIMES DAILY
Qty: 20 CAPSULE | Refills: 0 | Status: SHIPPED | OUTPATIENT
Start: 2024-09-27 | End: 2024-10-07

## 2024-09-27 ASSESSMENT — ENCOUNTER SYMPTOMS
FEVER: 0
CHILLS: 0
HEADACHES: 1
SORE THROAT: 1
FATIGUE: 1
COUGH: 0

## 2024-09-27 ASSESSMENT — PAIN SCALES - GENERAL: PAINLEVEL: 1

## 2024-09-27 NOTE — PATIENT INSTRUCTIONS
Take medications as directed.  You may take Tylenol or ibuprofen as needed for pain.  Stay well-hydrated with fluids.  Symptoms are not improving or worsening in the next 3 to 5 days then follow-up with your PCP or return to urgent care if needed.

## 2024-09-27 NOTE — PROGRESS NOTES
Subjective   Patient ID: Turner Knott is a 39 y.o. male. They present today with a chief complaint of Sore Throat (ST X 1 wk intermittent, exposure to strep from multiple family members. ).    History of Present Illness    Sore Throat   Associated symptoms include headaches. Pertinent negatives include no congestion or coughing.       Patient reports intermittent sore throat for the past week.  He states that his wife tested positive for strep 3 days ago and his kids tested positive for strep 2 days ago.  Reports having strep approximately 2 months ago and was placed on Augmentin.    Past Medical History  Allergies as of 09/27/2024 - Reviewed 09/27/2024   Allergen Reaction Noted    Shellfish derived Hives, Itching, and Shortness of breath 01/18/2010       (Not in a hospital admission)       Past Medical History:   Diagnosis Date    Anxiety     Depression        Past Surgical History:   Procedure Laterality Date    NASAL SEPTOPLASTY W/ TURBINOPLASTY Bilateral 04/05/2024    NO PAST SURGERIES      WISDOM TOOTH EXTRACTION          reports that he has never smoked. He has never used smokeless tobacco. He reports current alcohol use of about 10.0 standard drinks of alcohol per week. He reports current drug use. Drug: Marijuana.    Review of Systems  Review of Systems   Constitutional:  Positive for fatigue. Negative for chills and fever.   HENT:  Positive for sore throat. Negative for congestion and postnasal drip.    Respiratory:  Negative for cough.    Neurological:  Positive for headaches.                                  Objective    Vitals:    09/27/24 1405   BP: 126/65   Pulse: 67   Resp: 16   Temp: 36.3 °C (97.3 °F)   SpO2: 97%   Weight: 93 kg (205 lb)     No LMP for male patient.    Physical Exam  Constitutional:       Appearance: Normal appearance.   HENT:      Head: Normocephalic.      Right Ear: Tympanic membrane, ear canal and external ear normal.      Left Ear: Tympanic membrane, ear canal and external  ear normal.      Mouth/Throat:      Mouth: Mucous membranes are moist.      Pharynx: Posterior oropharyngeal erythema present.   Eyes:      Conjunctiva/sclera: Conjunctivae normal.   Cardiovascular:      Rate and Rhythm: Normal rate and regular rhythm.      Heart sounds: Normal heart sounds.   Pulmonary:      Effort: Pulmonary effort is normal.      Breath sounds: Normal breath sounds.   Lymphadenopathy:      Cervical: Cervical adenopathy present.   Skin:     General: Skin is warm and dry.   Neurological:      Mental Status: He is alert.         Procedures    Point of Care Test & Imaging Results from this visit  Results for orders placed or performed in visit on 09/27/24   POCT rapid strep A manually resulted   Result Value Ref Range    POC Rapid Strep Positive (A) Negative      No results found.    Diagnostic study results (if any) were reviewed by JUNIOR Cadet.    Assessment/Plan   Allergies, medications, history, and pertinent labs/EKGs/Imaging reviewed by JUNIOR Cadet.     Medical Decision Making  Rapid strep: Positive.  Will start patient on cephalexin for strep pharyngitis.  If symptoms are not improving or worsening in the next 3 to 5 days then follow-up with your PCP or return to urgent care if needed    Orders and Diagnoses  Diagnoses and all orders for this visit:  Strep throat  -     cephalexin (Keflex) 500 mg capsule; Take 1 capsule (500 mg) by mouth 2 times a day for 10 days.  Sore throat  -     POCT rapid strep A manually resulted      Medical Admin Record      Patient disposition: Home    Electronically signed by JUNIOR Cadet  2:24 PM

## 2024-12-16 ENCOUNTER — OFFICE VISIT (OUTPATIENT)
Dept: URGENT CARE | Age: 39
End: 2024-12-16
Payer: COMMERCIAL

## 2024-12-16 VITALS
WEIGHT: 205 LBS | HEART RATE: 104 BPM | OXYGEN SATURATION: 97 % | DIASTOLIC BLOOD PRESSURE: 68 MMHG | RESPIRATION RATE: 18 BRPM | SYSTOLIC BLOOD PRESSURE: 124 MMHG | TEMPERATURE: 100.2 F | BODY MASS INDEX: 24.95 KG/M2

## 2024-12-16 DIAGNOSIS — J02.0 STREP PHARYNGITIS: Primary | ICD-10-CM

## 2024-12-16 DIAGNOSIS — R05.1 ACUTE COUGH: ICD-10-CM

## 2024-12-16 DIAGNOSIS — J02.9 SORE THROAT: ICD-10-CM

## 2024-12-16 LAB — POC RAPID STREP: POSITIVE

## 2024-12-16 PROCEDURE — 87880 STREP A ASSAY W/OPTIC: CPT | Performed by: STUDENT IN AN ORGANIZED HEALTH CARE EDUCATION/TRAINING PROGRAM

## 2024-12-16 PROCEDURE — 99213 OFFICE O/P EST LOW 20 MIN: CPT | Performed by: STUDENT IN AN ORGANIZED HEALTH CARE EDUCATION/TRAINING PROGRAM

## 2024-12-16 RX ORDER — AMOXICILLIN AND CLAVULANATE POTASSIUM 875; 125 MG/1; MG/1
875 TABLET, FILM COATED ORAL 2 TIMES DAILY
Qty: 20 TABLET | Refills: 0 | Status: SHIPPED | OUTPATIENT
Start: 2024-12-16 | End: 2024-12-26

## 2024-12-16 RX ORDER — BENZONATATE 200 MG/1
200 CAPSULE ORAL 3 TIMES DAILY PRN
Qty: 30 CAPSULE | Refills: 0 | Status: SHIPPED | OUTPATIENT
Start: 2024-12-16 | End: 2024-12-26

## 2024-12-16 ASSESSMENT — PAIN SCALES - GENERAL: PAINLEVEL_OUTOF10: 7

## 2024-12-16 ASSESSMENT — ENCOUNTER SYMPTOMS
CONSTITUTIONAL NEGATIVE: 1
COUGH: 1
SORE THROAT: 1

## 2024-12-16 NOTE — PROGRESS NOTES
Subjective   Patient ID: Turner Knott is a 39 y.o. male. They present today with a chief complaint of Sore Throat (ST X 4 days. ).    History of Present Illness    Sore Throat   Associated symptoms include coughing.     Patient presents to the urgent care for a chief complaint of sore throat over the last 4 days, patient was exposed to strep via child, patient also has additional complaint of cough that has been lingering over the last 2 weeks.  No reports of respiratory distress    Past Medical History  Allergies as of 12/16/2024 - Reviewed 12/16/2024   Allergen Reaction Noted    Shellfish derived Hives, Itching, and Shortness of breath 01/18/2010       (Not in a hospital admission)       Past Medical History:   Diagnosis Date    Anxiety     Depression        Past Surgical History:   Procedure Laterality Date    NASAL SEPTOPLASTY W/ TURBINOPLASTY Bilateral 04/05/2024    NO PAST SURGERIES      WISDOM TOOTH EXTRACTION          reports that he has never smoked. He has never used smokeless tobacco. He reports current alcohol use of about 10.0 standard drinks of alcohol per week. He reports current drug use. Drug: Marijuana.    Review of Systems  Review of Systems   Constitutional: Negative.    HENT:  Positive for sore throat.    Respiratory:  Positive for cough.                                   Objective    Vitals:    12/16/24 1310   BP: 124/68   Pulse: 104   Resp: 18   Temp: 37.9 °C (100.2 °F)   SpO2: 97%   Weight: 93 kg (205 lb)     No LMP for male patient.    Physical Exam  Vitals and nursing note reviewed.   Constitutional:       General: He is not in acute distress.     Appearance: Normal appearance. He is not ill-appearing, toxic-appearing or diaphoretic.   HENT:      Head: Normocephalic and atraumatic.      Right Ear: Tympanic membrane normal. There is no impacted cerumen.      Left Ear: Tympanic membrane normal. There is no impacted cerumen.      Nose: Nose normal.      Mouth/Throat:      Mouth: Mucous  membranes are moist.      Pharynx: Oropharyngeal exudate present.      Comments: Upon examination of oral cavity uvula is midline tonsils are 2+ bilaterally presence of bilateral tonsillar exudate greater on the right than the left.  No audible stridor or wheeze no muffled or garbled voice patient is able to handle oral secretions speaking in full sentences  Cardiovascular:      Rate and Rhythm: Normal rate and regular rhythm.      Pulses: Normal pulses.      Heart sounds: Normal heart sounds.   Pulmonary:      Effort: Pulmonary effort is normal. No respiratory distress.      Breath sounds: Normal breath sounds.      Comments: 1 episode of cough during exam  Musculoskeletal:      Cervical back: Normal range of motion.   Lymphadenopathy:      Cervical: Cervical adenopathy present.   Neurological:      General: No focal deficit present.      Mental Status: He is alert and oriented to person, place, and time.   Psychiatric:         Mood and Affect: Mood normal.         Behavior: Behavior normal.         Procedures    Point of Care Test & Imaging Results from this visit  Results for orders placed or performed in visit on 12/16/24   POCT rapid strep A manually resulted   Result Value Ref Range    POC Rapid Strep Positive (A) Negative      No results found.    Diagnostic study results (if any) were reviewed by Oliverio Burgos PA-C.    Assessment/Plan   Allergies, medications, history, and pertinent labs/EKGs/Imaging reviewed by Oliverio Burgos PA-C.     Medical Decision Making  Patient will be placed on Augmentin for the treatment of strep throat, also will place on Tessalon Perles for lingering cough.  I did discuss contagiousness over the next 24 hours Also the Importance of Changing Toothbrushes.  Patient Was Advised If Any Signs of Respiratory Distress Such As Audible Stridor or Wheeze Muffled or Garbled Voice Inability Swallow Solids or Liquids or Drooling or Feeling of Throat Closure Patient Is to Call [phone] 1 or  Go to the Emergency Room.  Patient Verbalized Understanding Agreeable to Plan Discharge Emergent Care AOx4 Stable Condition No Signs of Distress.    Orders and Diagnoses  Diagnoses and all orders for this visit:  Sore throat  -     POCT rapid strep A manually resulted      Medical Admin Record      Patient disposition: Home    Electronically signed by Oliverio Burgos PA-C  1:23 PM

## 2024-12-23 ENCOUNTER — APPOINTMENT (OUTPATIENT)
Dept: PRIMARY CARE | Facility: CLINIC | Age: 39
End: 2024-12-23
Payer: COMMERCIAL

## 2024-12-23 VITALS
SYSTOLIC BLOOD PRESSURE: 135 MMHG | HEART RATE: 66 BPM | WEIGHT: 213 LBS | BODY MASS INDEX: 25.94 KG/M2 | DIASTOLIC BLOOD PRESSURE: 85 MMHG | OXYGEN SATURATION: 98 % | HEIGHT: 76 IN | TEMPERATURE: 97.5 F

## 2024-12-23 DIAGNOSIS — M79.672 LEFT FOOT PAIN: ICD-10-CM

## 2024-12-23 DIAGNOSIS — Z00.00 ROUTINE ADULT HEALTH MAINTENANCE: Primary | ICD-10-CM

## 2024-12-23 PROCEDURE — 99395 PREV VISIT EST AGE 18-39: CPT | Performed by: FAMILY MEDICINE

## 2024-12-23 PROCEDURE — 1036F TOBACCO NON-USER: CPT | Performed by: FAMILY MEDICINE

## 2024-12-23 PROCEDURE — 3008F BODY MASS INDEX DOCD: CPT | Performed by: FAMILY MEDICINE

## 2024-12-23 ASSESSMENT — PATIENT HEALTH QUESTIONNAIRE - PHQ9
2. FEELING DOWN, DEPRESSED OR HOPELESS: NOT AT ALL
SUM OF ALL RESPONSES TO PHQ9 QUESTIONS 1 & 2: 0
1. LITTLE INTEREST OR PLEASURE IN DOING THINGS: NOT AT ALL

## 2024-12-23 NOTE — PROGRESS NOTES
"Here for PE    ROS :   Denies fevers chills sweats malaise fatigue  Denies headache dysarthria aphasia focal weakness  Denies neck pain stiffness swollen glands sore throat otalgia otorrhea facial pressure  Denies chest pain shortness of breath diaphoresis nausea palpitations syncope presyncope dyspnea on exertion orthopnea cough  Denies abdominal pain nausea vomiting heartburn constipation diarrhea stool changes melena hematochezia  Denies urinary frequency dysuria hematuria pyuria hesitancy dribbling nocturia hematuria   Denies scrotal pain testicular mass erectile dysfunction  Denies numbness tingling weakness ataxia loss of dexterity  Denies depression anxiety insomnia  Complains of occasional anterior lateral foot pain over around distal fourth metatarsal.    PE:    /85   Pulse 66   Temp 36.4 °C (97.5 °F)   Ht 1.93 m (6' 4\")   Wt 96.6 kg (213 lb)   SpO2 98%   BMI 25.93 kg/m²      Appears comfortable  Not ill-appearing  Normocephalic atraumatic  Respirations normal  No retractions  Skin  without pallor petechia icterus cyanosis  Neck without JVD thyromegaly bruits adenopathy  Chest clear to auscultation without rales rhonchi wheeze  Heart regular rate and rhythm without murmur  Abdomen soft nondistended nontender without organomegaly or mass   testes descended without mass or hernia  Rectal without mass or heme  Prostate normal without enlargement nodularity bogginess asymmetry or mass  Extremities without erythema edema Homans or cord  Peripheral pulses palpable  Neuro cranial nerves II through XII intact   no sensory  motor loss or tremor  Gait normal   Affect normal  Does not appear anxious or depressed  Left foot without erythema.  There is no midfoot tenderness plantar fascia tenderness calcaneal tenderness.  There is no metatarsal or MTP tenderness or swelling or synovial thickening    He defers imaging for now as he is having no current pain and exam is benign      "

## (undated) DEVICE — Device

## (undated) DEVICE — TOWEL PACK, STERILE, 4/PACK, BLUE

## (undated) DEVICE — NEEDLE, HYPODERMIC, REGULAR WALL, REGULAR BEVEL, 27 G X 1.25 IN

## (undated) DEVICE — GLOVE, PROTEXIS PI CLASSIC, SZ-7.5, PF, LF

## (undated) DEVICE — SUTURE, PLAIN, 5-0, 18 IN, PC1, YELLOW

## (undated) DEVICE — TUBING, NON COND, 6MM X 20

## (undated) DEVICE — BLADE, INFERIOR TURBINATE, M4 ROTATABLE, 2MM STR

## (undated) DEVICE — PAD, EYE, OVAL, 1-5/8 X 2-5/8 IN, STERILE

## (undated) DEVICE — SUTURE, PROLENE, 2-0, 30 IN, SH, BLUE

## (undated) DEVICE — SUTURE, CHROMIC 4-0 RB1

## (undated) DEVICE — DEFOGGER, LIQUID SOLUTION, FOAM, ROUND BTL

## (undated) DEVICE — SYRINGE, HYPODERMIC, LEUR LOCK, 3 CC, PLASTIC, STERILE

## (undated) DEVICE — DEVICE, CRYOTHERAPY

## (undated) DEVICE — SOLUTION, INJECTION, USP, SODIUM CHLORIDE 0.9%, .9 NACL, 250 ML, BAG

## (undated) DEVICE — ELECTRODE, ELECTROSURGICAL, COAGULATOR, W/SUCTION, HANDSWITCHING, 8 FR, 6 IN

## (undated) DEVICE — SOLUTION, IRRIGATION, SODIUM CHLORIDE 0.9%, 1000 ML, POUR BOTTLE